# Patient Record
Sex: FEMALE | Race: OTHER | HISPANIC OR LATINO | URBAN - METROPOLITAN AREA
[De-identification: names, ages, dates, MRNs, and addresses within clinical notes are randomized per-mention and may not be internally consistent; named-entity substitution may affect disease eponyms.]

---

## 2017-04-29 ENCOUNTER — EMERGENCY (EMERGENCY)
Facility: HOSPITAL | Age: 22
LOS: 1 days | Discharge: HOME | End: 2017-04-29
Attending: EMERGENCY MEDICINE | Admitting: EMERGENCY MEDICINE
Payer: SELF-PAY

## 2017-04-29 VITALS
TEMPERATURE: 100 F | DIASTOLIC BLOOD PRESSURE: 74 MMHG | RESPIRATION RATE: 20 BRPM | HEART RATE: 87 BPM | SYSTOLIC BLOOD PRESSURE: 113 MMHG | OXYGEN SATURATION: 100 %

## 2017-04-29 VITALS — WEIGHT: 117.95 LBS | HEIGHT: 61 IN

## 2017-04-29 DIAGNOSIS — Z98.890 OTHER SPECIFIED POSTPROCEDURAL STATES: Chronic | ICD-10-CM

## 2017-04-29 PROCEDURE — 73030 X-RAY EXAM OF SHOULDER: CPT | Mod: 26,RT

## 2017-04-29 PROCEDURE — 99283 EMERGENCY DEPT VISIT LOW MDM: CPT | Mod: 25

## 2017-04-29 PROCEDURE — 73030 X-RAY EXAM OF SHOULDER: CPT

## 2017-04-29 PROCEDURE — 99283 EMERGENCY DEPT VISIT LOW MDM: CPT

## 2017-04-29 RX ORDER — IBUPROFEN 200 MG
600 TABLET ORAL ONCE
Qty: 0 | Refills: 0 | Status: COMPLETED | OUTPATIENT
Start: 2017-04-29 | End: 2017-04-29

## 2017-04-29 RX ADMIN — Medication 600 MILLIGRAM(S): at 17:44

## 2017-04-29 NOTE — ED STATDOCS - PROGRESS NOTE DETAILS
PA NOTE: Pt seen by intake physician and hpi/orders/plan reviewed. PT presenting to ED with complaints of right shoulder pain x 1 day.  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  Patient had rotator cuff surgery on the same shoulder 2 years ago.  PE: GEN: Awake, alert,  NAD,  EYES: PERRL Musculoskeletal: tenderness over AC joint - right side, no gross deformity or swelling, full ROM . NEURO: AOx3, no focal deficits SKIN: intact  PLAN: xray, sling, f/u with ortho

## 2017-04-29 NOTE — ED STATDOCS - OBJECTIVE STATEMENT
20 y/o female presents to ED c/o right shoulder pain s/p injury that occurred while at work earlier today. 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 Her current pain is exacerbated with movement. Pt reports PSHx R rotator cuff repair 2 years ago. No further complaints at this time.

## 2017-04-29 NOTE — ED STATDOCS - NS ED MD SCRIBE ATTENDING SCRIBE SECTIONS
VITAL SIGNS( Pullset)/PAST MEDICAL/SURGICAL/SOCIAL HISTORY/REVIEW OF SYSTEMS/DISPOSITION/HIV/PHYSICAL EXAM/HISTORY OF PRESENT ILLNESS

## 2017-04-29 NOTE — ED ADULT NURSE NOTE - OBJECTIVE STATEMENT
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 Fall precautions in place, will monitor.

## 2017-04-29 NOTE — ED STATDOCS - ATTENDING CONTRIBUTION TO CARE
I, Sanjuanita Sanabria, performed the initial face to face bedside interview with this patient regarding history of present illness, review of symptoms and relevant past medical, social and family history.  I completed an independent physical examination.  I was the initial provider who evaluated this patient. I have signed out the follow up of any pending tests (i.e. labs, radiological studies) to the ACP.  I have communicated the patient’s plan of care and disposition with the ACP.  The history, relevant review of systems, past medical and surgical history, medical decision making, and physical examination was documented by the scribe in my presence and I attest to the accuracy of the documentation.

## 2018-12-06 ENCOUNTER — APPOINTMENT (OUTPATIENT)
Dept: OBGYN | Facility: CLINIC | Age: 23
End: 2018-12-06
Payer: MEDICAID

## 2018-12-06 VITALS
SYSTOLIC BLOOD PRESSURE: 112 MMHG | HEIGHT: 61 IN | WEIGHT: 118 LBS | BODY MASS INDEX: 22.28 KG/M2 | DIASTOLIC BLOOD PRESSURE: 80 MMHG

## 2018-12-06 DIAGNOSIS — B37.2 CANDIDIASIS OF SKIN AND NAIL: ICD-10-CM

## 2018-12-06 DIAGNOSIS — Z82.3 FAMILY HISTORY OF STROKE: ICD-10-CM

## 2018-12-06 DIAGNOSIS — Z80.41 FAMILY HISTORY OF MALIGNANT NEOPLASM OF OVARY: ICD-10-CM

## 2018-12-06 DIAGNOSIS — Z83.3 FAMILY HISTORY OF DIABETES MELLITUS: ICD-10-CM

## 2018-12-06 DIAGNOSIS — Z78.9 OTHER SPECIFIED HEALTH STATUS: ICD-10-CM

## 2018-12-06 DIAGNOSIS — Z82.49 FAMILY HISTORY OF ISCHEMIC HEART DISEASE AND OTHER DISEASES OF THE CIRCULATORY SYSTEM: ICD-10-CM

## 2018-12-06 DIAGNOSIS — Z80.3 FAMILY HISTORY OF MALIGNANT NEOPLASM OF BREAST: ICD-10-CM

## 2018-12-06 PROCEDURE — 99203 OFFICE O/P NEW LOW 30 MIN: CPT

## 2018-12-10 LAB — BACTERIA GENITAL AEROBE CULT: ABNORMAL

## 2018-12-13 ENCOUNTER — APPOINTMENT (OUTPATIENT)
Dept: OBGYN | Facility: CLINIC | Age: 23
End: 2018-12-13

## 2019-05-03 ENCOUNTER — APPOINTMENT (OUTPATIENT)
Dept: OBGYN | Facility: CLINIC | Age: 24
End: 2019-05-03
Payer: MEDICAID

## 2019-05-03 VITALS
HEIGHT: 61 IN | DIASTOLIC BLOOD PRESSURE: 70 MMHG | BODY MASS INDEX: 22.28 KG/M2 | WEIGHT: 118 LBS | SYSTOLIC BLOOD PRESSURE: 120 MMHG

## 2019-05-03 PROCEDURE — 99213 OFFICE O/P EST LOW 20 MIN: CPT | Mod: 25

## 2019-05-03 PROCEDURE — 81003 URINALYSIS AUTO W/O SCOPE: CPT | Mod: QW

## 2019-05-03 PROCEDURE — 99395 PREV VISIT EST AGE 18-39: CPT

## 2019-05-05 LAB
BACTERIA UR CULT: NORMAL
C TRACH RRNA SPEC QL NAA+PROBE: NOT DETECTED
N GONORRHOEA RRNA SPEC QL NAA+PROBE: NOT DETECTED
SOURCE TP AMPLIFICATION: NORMAL

## 2019-05-09 ENCOUNTER — APPOINTMENT (OUTPATIENT)
Dept: OBGYN | Facility: CLINIC | Age: 24
End: 2019-05-09
Payer: MEDICAID

## 2019-05-09 ENCOUNTER — ASOB RESULT (OUTPATIENT)
Age: 24
End: 2019-05-09

## 2019-05-09 VITALS
DIASTOLIC BLOOD PRESSURE: 74 MMHG | SYSTOLIC BLOOD PRESSURE: 111 MMHG | HEART RATE: 82 BPM | WEIGHT: 119 LBS | HEIGHT: 61 IN | BODY MASS INDEX: 22.47 KG/M2

## 2019-05-09 DIAGNOSIS — N92.3 OVULATION BLEEDING: ICD-10-CM

## 2019-05-09 LAB — CYTOLOGY CVX/VAG DOC THIN PREP: NORMAL

## 2019-05-09 PROCEDURE — 99213 OFFICE O/P EST LOW 20 MIN: CPT

## 2019-05-09 PROCEDURE — 76830 TRANSVAGINAL US NON-OB: CPT

## 2019-05-09 PROCEDURE — 76857 US EXAM PELVIC LIMITED: CPT

## 2019-09-18 ENCOUNTER — APPOINTMENT (OUTPATIENT)
Dept: OBGYN | Facility: CLINIC | Age: 24
End: 2019-09-18
Payer: MEDICAID

## 2019-09-18 ENCOUNTER — RESULT CHARGE (OUTPATIENT)
Age: 24
End: 2019-09-18

## 2019-09-18 VITALS
HEIGHT: 61 IN | SYSTOLIC BLOOD PRESSURE: 128 MMHG | BODY MASS INDEX: 21.9 KG/M2 | HEART RATE: 89 BPM | DIASTOLIC BLOOD PRESSURE: 79 MMHG | WEIGHT: 116 LBS

## 2019-09-18 LAB
BILIRUB UR QL STRIP: NORMAL
GLUCOSE UR-MCNC: NORMAL
HCG UR QL: 0.2 EU/DL
HGB UR QL STRIP.AUTO: NORMAL
KETONES UR-MCNC: NORMAL
LEUKOCYTE ESTERASE UR QL STRIP: NORMAL
NITRITE UR QL STRIP: NORMAL
PH UR STRIP: 5.5
PROT UR STRIP-MCNC: NORMAL
SP GR UR STRIP: >=1.03

## 2019-09-18 PROCEDURE — 99214 OFFICE O/P EST MOD 30 MIN: CPT

## 2019-09-18 PROCEDURE — 81003 URINALYSIS AUTO W/O SCOPE: CPT | Mod: QW

## 2019-09-18 NOTE — PHYSICAL EXAM
[Mass] : no breast mass [Nipple Discharge] : no nipple discharge [Soft] : soft [Axillary LAD] : no axillary lymphadenopathy [Tender] : non tender [Distended] : not distended [H/Smegaly] : no hepatosplenomegaly [None] : no CVA tenderness

## 2019-11-02 NOTE — COUNSELING
2Assessment/Plan:    No problem-specific Assessment & Plan notes found for this encounter  Diagnoses and all orders for this visit:    Inflamed skin tag  -     Skin tag removal  -     Tissue Exam          Subjective:   Chief Complaint   Patient presents with    Procedure     skin tag removal        Patient ID: Phillip Herrera is a 79 y o  female      Patient has 2 inflamed skin tags under her breast Patient has had them for several years Patient notes taht they are red and painful and get caught in her bra Patient has no other compalints        The following portions of the patient's history were reviewed and updated as appropriate: allergies, current medications, past social history and problem list     Review of Systems   Skin:        slin tags as above         Objective:      /72   Temp 97 7 °F (36 5 °C)   Ht 5' 3" (1 6 m)   Wt 83 3 kg (183 lb 9 6 oz)   BMI 32 52 kg/m²     Details of the Procedure   Verbal consent was obtained and risk of infection and bleeding were discussed  Area was cleansed with alcohol and betadine Both sites were injected with 0 5ml of 1% lidocaine with epinephrine Area was sterilly draped Then cleansed again with betadine and slchol A picl up was used to lift each skine tage and then dermablade was used to shave lesion Adequated control of bleeding was obtained with silver nitrate stick Steril dressing were applied Patient to keep those in place for 24 hours then may bathe normally Signs of infection were reviewed with patient       Physical Exam   Skin:   2 skin tags under breast that are 3 mm in size [Breast Self Exam] : breast self exam Alert and oriented to person, place and time

## 2020-04-30 ENCOUNTER — TRANSCRIPTION ENCOUNTER (OUTPATIENT)
Age: 25
End: 2020-04-30

## 2020-04-30 ENCOUNTER — APPOINTMENT (OUTPATIENT)
Dept: OBGYN | Facility: CLINIC | Age: 25
End: 2020-04-30
Payer: MEDICAID

## 2020-04-30 DIAGNOSIS — R10.2 PELVIC AND PERINEAL PAIN: ICD-10-CM

## 2020-04-30 LAB
BILIRUB UR QL STRIP: NORMAL
CLARITY UR: CLEAR
COLLECTION METHOD: NORMAL
GLUCOSE UR-MCNC: NORMAL
HCG UR QL: 0.2 EU/DL
HGB UR QL STRIP.AUTO: NORMAL
KETONES UR-MCNC: NORMAL
LEUKOCYTE ESTERASE UR QL STRIP: NORMAL
NITRITE UR QL STRIP: NORMAL
PH UR STRIP: 6
PROT UR STRIP-MCNC: NORMAL
SP GR UR STRIP: 1.02

## 2020-04-30 PROCEDURE — 99214 OFFICE O/P EST MOD 30 MIN: CPT | Mod: 95

## 2020-04-30 NOTE — HISTORY OF PRESENT ILLNESS
[Home] : at home, [unfilled] , at the time of the visit. [Medical Office: ([facility])___] : at the medical office located in  [Patient] : the patient

## 2020-05-04 LAB — BACTERIA UR CULT: NORMAL

## 2020-05-05 ENCOUNTER — APPOINTMENT (OUTPATIENT)
Dept: OBGYN | Facility: CLINIC | Age: 25
End: 2020-05-05
Payer: COMMERCIAL

## 2020-05-05 ENCOUNTER — ASOB RESULT (OUTPATIENT)
Age: 25
End: 2020-05-05

## 2020-05-05 VITALS
BODY MASS INDEX: 21.52 KG/M2 | WEIGHT: 114 LBS | SYSTOLIC BLOOD PRESSURE: 111 MMHG | HEIGHT: 61 IN | DIASTOLIC BLOOD PRESSURE: 73 MMHG

## 2020-05-05 DIAGNOSIS — R35.0 FREQUENCY OF MICTURITION: ICD-10-CM

## 2020-05-05 DIAGNOSIS — R35.1 NOCTURIA: ICD-10-CM

## 2020-05-05 PROCEDURE — 99214 OFFICE O/P EST MOD 30 MIN: CPT

## 2020-05-05 PROCEDURE — 76830 TRANSVAGINAL US NON-OB: CPT

## 2020-05-05 PROCEDURE — 76856 US EXAM PELVIC COMPLETE: CPT | Mod: 59

## 2020-11-24 ENCOUNTER — APPOINTMENT (OUTPATIENT)
Dept: OBGYN | Facility: CLINIC | Age: 25
End: 2020-11-24
Payer: MEDICAID

## 2020-11-24 VITALS
HEIGHT: 61 IN | BODY MASS INDEX: 21.52 KG/M2 | DIASTOLIC BLOOD PRESSURE: 70 MMHG | WEIGHT: 114 LBS | SYSTOLIC BLOOD PRESSURE: 100 MMHG

## 2020-11-24 DIAGNOSIS — N76.4 ABSCESS OF VULVA: ICD-10-CM

## 2020-11-24 PROCEDURE — 99213 OFFICE O/P EST LOW 20 MIN: CPT | Mod: 25

## 2020-11-24 PROCEDURE — 56405 I&D VULVA/PERINEAL ABSCESS: CPT

## 2020-11-24 NOTE — PROCEDURE
[I & D] : I&D [Culture sent] : culture sent [Right] : right [Size: ___cm] : Cyst is ~Vcm [____% Lidocaine w/Epi] : [unfilled]% Lidocaine with Epi [Purulent Fluid] : purulent fluid [Tolerated Well] : The patient tolerated the procedure well [No Complications] : There were no complications

## 2020-11-24 NOTE — DISCUSSION/SUMMARY
[FreeTextEntry1] : vulvar abscess noted. I and d performed w culture. ag no3 for hemostasis. \par no  cellulitis. culture sent.

## 2020-11-24 NOTE — HISTORY OF PRESENT ILLNESS
[FreeTextEntry1] : 24 yo p1 here for treatment of recurrent vulvar boil, has happened 3 times after shaving, in right groin.

## 2020-11-28 LAB — BACTERIA SPEC CULT: NORMAL

## 2020-12-16 ENCOUNTER — TRANSCRIPTION ENCOUNTER (OUTPATIENT)
Age: 25
End: 2020-12-16

## 2021-01-11 ENCOUNTER — APPOINTMENT (OUTPATIENT)
Dept: OBGYN | Facility: CLINIC | Age: 26
End: 2021-01-11
Payer: MEDICAID

## 2021-01-11 VITALS
HEIGHT: 61 IN | DIASTOLIC BLOOD PRESSURE: 70 MMHG | BODY MASS INDEX: 21.34 KG/M2 | WEIGHT: 113 LBS | SYSTOLIC BLOOD PRESSURE: 120 MMHG

## 2021-01-11 DIAGNOSIS — Z30.012 ENCOUNTER FOR PRESCRIPTION OF EMERGENCY CONTRACEPTION: ICD-10-CM

## 2021-01-11 DIAGNOSIS — Z30.011 ENCOUNTER FOR INITIAL PRESCRIPTION OF CONTRACEPTIVE PILLS: ICD-10-CM

## 2021-01-11 PROCEDURE — 99395 PREV VISIT EST AGE 18-39: CPT

## 2021-01-11 PROCEDURE — 99072 ADDL SUPL MATRL&STAF TM PHE: CPT

## 2021-01-11 RX ORDER — NORGESTIMATE AND ETHINYL ESTRADIOL 7DAYSX3 LO
0.18/0.215/0.25 KIT ORAL DAILY
Qty: 3 | Refills: 2 | Status: COMPLETED | COMMUNITY
Start: 2019-09-18 | End: 2021-01-11

## 2021-01-11 NOTE — PLAN
[FreeTextEntry1] : CBE performed and SBE taught\par Pt counseled on ingrown hair and clippers over razor for grooming\par R/B/A of SARC vs LARC discussed and Pt wishes for SARC; Sprintec sent to pharmacy with direction\par PapSmear performed\par MyRisk information provided to Pt secondary to BC/OC Hx\par Follow up in one year or as needed

## 2021-01-11 NOTE — HISTORY OF PRESENT ILLNESS
[FreeTextEntry1] : 25 year old G1/1 female presenting for her annual.  She has a cyst she would like to be examined.  She is also interested in contraception today.  Hx of one prior vaginal delivery.  Denies GYN Hx of uterine fibroids, ovarian cysts or STI.  Denies medical and surgical Hx.  Family Hx of two first degree relatives with BC and OC.

## 2021-01-11 NOTE — PHYSICAL EXAM
[Appropriately responsive] : appropriately responsive [Alert] : alert [No Acute Distress] : no acute distress [No Lymphadenopathy] : no lymphadenopathy [Regular Rate Rhythm] : regular rate rhythm [No Murmurs] : no murmurs [Clear to Auscultation B/L] : clear to auscultation bilaterally [Soft] : soft [Non-tender] : non-tender [Non-distended] : non-distended [No HSM] : No HSM [No Lesions] : no lesions [No Mass] : no mass [Oriented x3] : oriented x3 [Examination Of The Breasts] : a normal appearance [No Masses] : no breast masses were palpable [Labia Majora] : normal [Labia Minora] : normal [Normal] : normal [Uterine Adnexae] : normal [FreeTextEntry1] : Small cyst on right outer mons; drained manually with pressure

## 2021-01-13 LAB
C TRACH RRNA SPEC QL NAA+PROBE: NOT DETECTED
N GONORRHOEA RRNA SPEC QL NAA+PROBE: NOT DETECTED
SOURCE TP AMPLIFICATION: NORMAL

## 2021-01-15 LAB — CYTOLOGY CVX/VAG DOC THIN PREP: ABNORMAL

## 2021-01-29 ENCOUNTER — OUTPATIENT (OUTPATIENT)
Dept: OUTPATIENT SERVICES | Facility: HOSPITAL | Age: 26
LOS: 1 days | End: 2021-01-29
Payer: MEDICAID

## 2021-01-29 ENCOUNTER — APPOINTMENT (OUTPATIENT)
Dept: ULTRASOUND IMAGING | Facility: CLINIC | Age: 26
End: 2021-01-29
Payer: MEDICAID

## 2021-01-29 DIAGNOSIS — Z80.3 FAMILY HISTORY OF MALIGNANT NEOPLASM OF BREAST: ICD-10-CM

## 2021-01-29 DIAGNOSIS — Z98.890 OTHER SPECIFIED POSTPROCEDURAL STATES: Chronic | ICD-10-CM

## 2021-01-29 DIAGNOSIS — R92.8 OTHER ABNORMAL AND INCONCLUSIVE FINDINGS ON DIAGNOSTIC IMAGING OF BREAST: ICD-10-CM

## 2021-01-29 PROCEDURE — 76641 ULTRASOUND BREAST COMPLETE: CPT | Mod: 26,50

## 2021-01-29 PROCEDURE — 76641 ULTRASOUND BREAST COMPLETE: CPT

## 2021-04-24 ENCOUNTER — TRANSCRIPTION ENCOUNTER (OUTPATIENT)
Age: 26
End: 2021-04-24

## 2021-06-07 ENCOUNTER — APPOINTMENT (OUTPATIENT)
Dept: OBGYN | Facility: CLINIC | Age: 26
End: 2021-06-07
Payer: MEDICAID

## 2021-06-07 VITALS
BODY MASS INDEX: 20.96 KG/M2 | WEIGHT: 111 LBS | SYSTOLIC BLOOD PRESSURE: 114 MMHG | HEIGHT: 61 IN | DIASTOLIC BLOOD PRESSURE: 70 MMHG

## 2021-06-07 DIAGNOSIS — R39.9 UNSPECIFIED SYMPTOMS AND SIGNS INVOLVING THE GENITOURINARY SYSTEM: ICD-10-CM

## 2021-06-07 DIAGNOSIS — B37.3 CANDIDIASIS OF VULVA AND VAGINA: ICD-10-CM

## 2021-06-07 PROCEDURE — 99214 OFFICE O/P EST MOD 30 MIN: CPT

## 2021-06-07 PROCEDURE — 99072 ADDL SUPL MATRL&STAF TM PHE: CPT

## 2021-06-07 NOTE — PLAN
[FreeTextEntry1] : \par Subjective history and physical examination consistent with a vaginal yeast infection and Rx provided.  Patient was instructed to continue her Macrobid, as directed.  Her cultures were pending and she should be contacted by her other office concerning sensitivities of these cultures.  Patient was advised to follow-up, regarding these cultures.\par \par Family history significant for breast cancer and patient was provided an ultrasound Rx in January.  She expressed concern today regarding breast discomfort and she was provided with information for Massiel Montalvo M.D.  She was instructed that if she has any issues scheduling appointment, she may contact our office and I can provide a referral based on her family history and current complaint.  All questions addressed.

## 2021-06-07 NOTE — HISTORY OF PRESENT ILLNESS
[FreeTextEntry1] : 25-year-old female presenting for symptoms, specifically vaginal discharge.  She was seen by her primary care physician and started on Macrobid twice daily for UTI symptoms and a culture is currently pending.  She is on day 3 of this antibiotic.  She reports that the discharge started at a similar time.  She reports feeling like she has to use the bathroom, but not much urine is produced.\par \par Patient has a family history significant for breast cancer, and is concerned about breast discomfort, her discomfort is around her underwire area.  She was given Rx for an ultrasound in January.\par

## 2021-06-07 NOTE — PHYSICAL EXAM
[Labia Majora] : normal [Labia Minora] : normal [Normal] : normal [FreeTextEntry4] : Scant yeast in the posterior fornix [FreeTextEntry5] : Vaginal culture collected

## 2021-06-10 LAB
A VAGINAE DNA VAG QL NAA+PROBE: NORMAL
BVAB2 DNA VAG QL NAA+PROBE: NORMAL
C KRUSEI DNA VAG QL NAA+PROBE: NEGATIVE
MEGA1 DNA VAG QL NAA+PROBE: NORMAL
T VAGINALIS RRNA SPEC QL NAA+PROBE: NEGATIVE

## 2021-12-16 ENCOUNTER — EMERGENCY (EMERGENCY)
Facility: HOSPITAL | Age: 26
LOS: 1 days | Discharge: HOME | End: 2021-12-16
Attending: EMERGENCY MEDICINE
Payer: COMMERCIAL

## 2021-12-16 VITALS
HEART RATE: 120 BPM | SYSTOLIC BLOOD PRESSURE: 132 MMHG | HEIGHT: 61 IN | OXYGEN SATURATION: 99 % | DIASTOLIC BLOOD PRESSURE: 95 MMHG | WEIGHT: 115.08 LBS | RESPIRATION RATE: 20 BRPM | TEMPERATURE: 99 F

## 2021-12-16 VITALS
OXYGEN SATURATION: 99 % | HEART RATE: 102 BPM | SYSTOLIC BLOOD PRESSURE: 136 MMHG | TEMPERATURE: 99 F | DIASTOLIC BLOOD PRESSURE: 93 MMHG | RESPIRATION RATE: 19 BRPM

## 2021-12-16 DIAGNOSIS — Z98.890 OTHER SPECIFIED POSTPROCEDURAL STATES: Chronic | ICD-10-CM

## 2021-12-16 PROCEDURE — 99283 EMERGENCY DEPT VISIT LOW MDM: CPT | Mod: 25

## 2021-12-16 PROCEDURE — 94640 AIRWAY INHALATION TREATMENT: CPT

## 2021-12-16 PROCEDURE — 99284 EMERGENCY DEPT VISIT MOD MDM: CPT

## 2021-12-16 RX ORDER — IPRATROPIUM/ALBUTEROL SULFATE 18-103MCG
3 AEROSOL WITH ADAPTER (GRAM) INHALATION ONCE
Refills: 0 | Status: COMPLETED | OUTPATIENT
Start: 2021-12-16 | End: 2021-12-16

## 2021-12-16 RX ADMIN — Medication 50 MILLIGRAM(S): at 22:47

## 2021-12-16 RX ADMIN — Medication 3 MILLILITER(S): at 21:47

## 2021-12-16 NOTE — ED PROVIDER NOTE - CLINICAL SUMMARY MEDICAL DECISION MAKING FREE TEXT BOX
24yo female with history of asthma presenting with chest tightness and shortness of breath x 1 day without symptom relief from her Qvar inhaler. Denies cough, chest pain, fever, chills. On examination patient with unlabored respiratory effort. Lungs CTAB. No wheezes. Concerning for but not limited to asthma exacerbation. Will order duoneb and reassess. 26yo female with history of asthma presenting with chest tightness and shortness of breath x 1 day without symptom relief from her Qvar inhaler. Denies cough, chest pain, fever, chills. On examination patient with unlabored respiratory effort. Lungs CTAB. No wheezes. Concerning for but not limited to asthma exacerbation. Will order duoneb, PO prednisone and reassess.

## 2021-12-16 NOTE — ED PROVIDER NOTE - OBJECTIVE STATEMENT
26yo female with history of asthma presenting with chest tightness and shortness of breath since this morning without fevers or cough. Patient states she is on Qvar inhaler BID without symptom relief today. Patient denies headache, dizziness, chest pain, abdominal pain, n/v/d, urinary symptoms, focal neurologic symptoms. Patient has close follow up with her pulmonologist and reports last asthma exacerbations in the summer 2/2 allergies.

## 2021-12-16 NOTE — ED PROVIDER NOTE - NSFOLLOWUPINSTRUCTIONS_ED_ALL_ED_FT
take your medication as prescribed    follow up with pulmonologist within 1 week     Conversation had with patient regarding results of testing. Patient agrees with plan for discharge at this time. Patient agrees to comply with follow up with PCP. Return to ED precautions and discharge instructions given to patient.    Asthma    Asthma is a condition in which the airways tighten and narrow, making it difficult to breath. Asthma episodes, also called asthma attacks, range from minor to life-threatening. Symptoms include wheezing, coughing, chest tightness, or shortness of breath. The diagnosis of asthma is made by a review of your medical history and a physical exam, but may involve additional testing. Asthma cannot be cured, but medicines and lifestyle changes can help control it. Avoid triggers of asthma which may include animal dander, pollen, mold, smoke, air pollutants, etc.     SEEK IMMEDIATE MEDICAL CARE IF YOU HAVE ANY OF THE FOLLOWING SYMPTOMS: worsening of symptoms, shortness of breath at rest, chest pain, bluish discoloration to lips or fingertips, lightheadedness/dizziness, or fever.

## 2021-12-16 NOTE — ED ADULT TRIAGE NOTE - CHIEF COMPLAINT QUOTE
patient states that she has asthma, used inhaler today not feeling any better feels tightness to chest

## 2021-12-16 NOTE — ED PROVIDER NOTE - PATIENT PORTAL LINK FT
You can access the FollowMyHealth Patient Portal offered by [facility] by registering at the following website: [email or web address]. By joining [employer]’s FollowMyHealth portal, you will also be able to view your health information using other applications (apps) compatible with our system.

## 2021-12-16 NOTE — ED PROVIDER NOTE - PHYSICAL EXAMINATION
General: Well appearing in no acute respiratory distress. Alert and cooperative.   Head: Normocephalic, atraumatic.  Eyes: PERRLA. No conjunctival injection. No scleral icterus. EOMI  ENMT: Atraumatic external nose and ears. Moist mucous membranes. Oropharynx clear.  Neck: Soft and supple. Full ROM without pain. No midline tenderness.   Cardiac: Regular rate and regular rhythm. No murmurs. Peripheral pulses 2+ and symmetric in all extremities. No LE edema.  Resp: Unlabored respiratory effort. Lungs CTAB. Speaking in full sentences. No wheezes. No conversational dyspnea.   Abd: Soft, non-tender, non-distended. No guarding or rebound. No scars.  MSK: Spine midline and non-tender.   Skin: Warm and dry.   Neuro: AO x 3. Moves all extremities symmetrically. Motor strength and sensation grossly intact.

## 2021-12-16 NOTE — ED PROVIDER NOTE - ATTENDING CONTRIBUTION TO CARE
I, Severo Duckworth, personally saw the patient with the resident, and completed the key components of the history and physical exam. I then discussed the management plan with the resident.    25 yo F hx of asthma p/w sob not relieved with inhaler. no fever. mild wheezing. patient given duoneb and prednisone. patient report feeling better and comfortable going home with medrol dose pack.

## 2021-12-25 ENCOUNTER — EMERGENCY (EMERGENCY)
Facility: HOSPITAL | Age: 26
LOS: 1 days | Discharge: HOME | End: 2021-12-25
Attending: EMERGENCY MEDICINE
Payer: COMMERCIAL

## 2021-12-25 VITALS
RESPIRATION RATE: 20 BRPM | TEMPERATURE: 100 F | HEIGHT: 61 IN | WEIGHT: 115.08 LBS | HEART RATE: 116 BPM | OXYGEN SATURATION: 100 % | SYSTOLIC BLOOD PRESSURE: 141 MMHG | DIASTOLIC BLOOD PRESSURE: 99 MMHG

## 2021-12-25 VITALS — HEART RATE: 100 BPM

## 2021-12-25 DIAGNOSIS — Z98.890 OTHER SPECIFIED POSTPROCEDURAL STATES: Chronic | ICD-10-CM

## 2021-12-25 LAB
ALBUMIN SERPL ELPH-MCNC: 4.4 G/DL — SIGNIFICANT CHANGE UP (ref 3.3–5.2)
ALP SERPL-CCNC: 40 U/L — SIGNIFICANT CHANGE UP (ref 40–120)
ALT FLD-CCNC: 9 U/L — SIGNIFICANT CHANGE UP
ANION GAP SERPL CALC-SCNC: 12 MMOL/L — SIGNIFICANT CHANGE UP (ref 5–17)
AST SERPL-CCNC: 13 U/L — SIGNIFICANT CHANGE UP
BASOPHILS # BLD AUTO: 0.03 K/UL — SIGNIFICANT CHANGE UP (ref 0–0.2)
BASOPHILS NFR BLD AUTO: 0.4 % — SIGNIFICANT CHANGE UP (ref 0–2)
BILIRUB SERPL-MCNC: <0.2 MG/DL — LOW (ref 0.4–2)
BUN SERPL-MCNC: 7.6 MG/DL — LOW (ref 8–20)
CALCIUM SERPL-MCNC: 9.4 MG/DL — SIGNIFICANT CHANGE UP (ref 8.6–10.2)
CHLORIDE SERPL-SCNC: 103 MMOL/L — SIGNIFICANT CHANGE UP (ref 98–107)
CO2 SERPL-SCNC: 23 MMOL/L — SIGNIFICANT CHANGE UP (ref 22–29)
CREAT SERPL-MCNC: 0.64 MG/DL — SIGNIFICANT CHANGE UP (ref 0.5–1.3)
D DIMER BLD IA.RAPID-MCNC: <150 NG/ML DDU — SIGNIFICANT CHANGE UP
EOSINOPHIL # BLD AUTO: 0.05 K/UL — SIGNIFICANT CHANGE UP (ref 0–0.5)
EOSINOPHIL NFR BLD AUTO: 0.7 % — SIGNIFICANT CHANGE UP (ref 0–6)
GLUCOSE SERPL-MCNC: 90 MG/DL — SIGNIFICANT CHANGE UP (ref 70–99)
HCG SERPL-ACNC: <4 MIU/ML — SIGNIFICANT CHANGE UP
HCT VFR BLD CALC: 42.2 % — SIGNIFICANT CHANGE UP (ref 34.5–45)
HGB BLD-MCNC: 14.2 G/DL — SIGNIFICANT CHANGE UP (ref 11.5–15.5)
IMM GRANULOCYTES NFR BLD AUTO: 0.3 % — SIGNIFICANT CHANGE UP (ref 0–1.5)
LYMPHOCYTES # BLD AUTO: 2.37 K/UL — SIGNIFICANT CHANGE UP (ref 1–3.3)
LYMPHOCYTES # BLD AUTO: 32.2 % — SIGNIFICANT CHANGE UP (ref 13–44)
MCHC RBC-ENTMCNC: 28.7 PG — SIGNIFICANT CHANGE UP (ref 27–34)
MCHC RBC-ENTMCNC: 33.6 GM/DL — SIGNIFICANT CHANGE UP (ref 32–36)
MCV RBC AUTO: 85.3 FL — SIGNIFICANT CHANGE UP (ref 80–100)
MONOCYTES # BLD AUTO: 0.48 K/UL — SIGNIFICANT CHANGE UP (ref 0–0.9)
MONOCYTES NFR BLD AUTO: 6.5 % — SIGNIFICANT CHANGE UP (ref 2–14)
NEUTROPHILS # BLD AUTO: 4.4 K/UL — SIGNIFICANT CHANGE UP (ref 1.8–7.4)
NEUTROPHILS NFR BLD AUTO: 59.9 % — SIGNIFICANT CHANGE UP (ref 43–77)
PLATELET # BLD AUTO: 321 K/UL — SIGNIFICANT CHANGE UP (ref 150–400)
POTASSIUM SERPL-MCNC: 4.2 MMOL/L — SIGNIFICANT CHANGE UP (ref 3.5–5.3)
POTASSIUM SERPL-SCNC: 4.2 MMOL/L — SIGNIFICANT CHANGE UP (ref 3.5–5.3)
PROT SERPL-MCNC: 7.6 G/DL — SIGNIFICANT CHANGE UP (ref 6.6–8.7)
RAPID RVP RESULT: SIGNIFICANT CHANGE UP
RBC # BLD: 4.95 M/UL — SIGNIFICANT CHANGE UP (ref 3.8–5.2)
RBC # FLD: 12.2 % — SIGNIFICANT CHANGE UP (ref 10.3–14.5)
SARS-COV-2 RNA SPEC QL NAA+PROBE: SIGNIFICANT CHANGE UP
SODIUM SERPL-SCNC: 138 MMOL/L — SIGNIFICANT CHANGE UP (ref 135–145)
WBC # BLD: 7.35 K/UL — SIGNIFICANT CHANGE UP (ref 3.8–10.5)
WBC # FLD AUTO: 7.35 K/UL — SIGNIFICANT CHANGE UP (ref 3.8–10.5)

## 2021-12-25 PROCEDURE — 84702 CHORIONIC GONADOTROPIN TEST: CPT

## 2021-12-25 PROCEDURE — 94640 AIRWAY INHALATION TREATMENT: CPT

## 2021-12-25 PROCEDURE — 99285 EMERGENCY DEPT VISIT HI MDM: CPT

## 2021-12-25 PROCEDURE — 96374 THER/PROPH/DIAG INJ IV PUSH: CPT

## 2021-12-25 PROCEDURE — 99284 EMERGENCY DEPT VISIT MOD MDM: CPT | Mod: 25

## 2021-12-25 PROCEDURE — 36415 COLL VENOUS BLD VENIPUNCTURE: CPT

## 2021-12-25 PROCEDURE — 71045 X-RAY EXAM CHEST 1 VIEW: CPT | Mod: 26

## 2021-12-25 PROCEDURE — 80053 COMPREHEN METABOLIC PANEL: CPT

## 2021-12-25 PROCEDURE — 0225U NFCT DS DNA&RNA 21 SARSCOV2: CPT

## 2021-12-25 PROCEDURE — 85025 COMPLETE CBC W/AUTO DIFF WBC: CPT

## 2021-12-25 PROCEDURE — 85379 FIBRIN DEGRADATION QUANT: CPT

## 2021-12-25 PROCEDURE — 71045 X-RAY EXAM CHEST 1 VIEW: CPT

## 2021-12-25 RX ORDER — IPRATROPIUM/ALBUTEROL SULFATE 18-103MCG
3 AEROSOL WITH ADAPTER (GRAM) INHALATION ONCE
Refills: 0 | Status: COMPLETED | OUTPATIENT
Start: 2021-12-25 | End: 2021-12-25

## 2021-12-25 RX ORDER — SODIUM CHLORIDE 9 MG/ML
1000 INJECTION INTRAMUSCULAR; INTRAVENOUS; SUBCUTANEOUS ONCE
Refills: 0 | Status: COMPLETED | OUTPATIENT
Start: 2021-12-25 | End: 2021-12-25

## 2021-12-25 RX ORDER — MAGNESIUM SULFATE 500 MG/ML
2 VIAL (ML) INJECTION ONCE
Refills: 0 | Status: COMPLETED | OUTPATIENT
Start: 2021-12-25 | End: 2021-12-25

## 2021-12-25 RX ORDER — ALBUTEROL 90 UG/1
3 AEROSOL, METERED ORAL
Qty: 360 | Refills: 0
Start: 2021-12-25 | End: 2022-01-23

## 2021-12-25 RX ORDER — ACETAMINOPHEN 500 MG
650 TABLET ORAL ONCE
Refills: 0 | Status: COMPLETED | OUTPATIENT
Start: 2021-12-25 | End: 2021-12-25

## 2021-12-25 RX ADMIN — Medication 3 MILLILITER(S): at 19:42

## 2021-12-25 RX ADMIN — SODIUM CHLORIDE 1000 MILLILITER(S): 9 INJECTION INTRAMUSCULAR; INTRAVENOUS; SUBCUTANEOUS at 21:02

## 2021-12-25 RX ADMIN — Medication 650 MILLIGRAM(S): at 19:42

## 2021-12-25 RX ADMIN — Medication 150 GRAM(S): at 19:42

## 2021-12-25 NOTE — ED STATDOCS - PROGRESS NOTE DETAILS
POLO- PT evaluated by intake physician. HPI/PE/ROS as noted above. Will follow up plan per intake physician POLO- Pt reassessed, pt feeling better at this time, vss, pt able to walk, talk and vocalized plan of action. Discussed in depth and explained to pt in depth the next steps that need to be taking including proper follow up with PCP or specialists. All incidental findings were discussed with pt as well. Pt verbalized their concerns and all questions were answered. Pt understands dispo and wants discharge. Given good instructions when to return to ED and importance of f/u.

## 2021-12-25 NOTE — ED STATDOCS - ATTENDING CONTRIBUTION TO CARE
I, Kamran Aleman, performed the initial face to face bedside interview with this patient regarding history of present illness, review of symptoms and relevant past medical, social and family history.  I completed an independent physical examination.  I was the initial provider who evaluated this patient. I have signed out the follow up of any pending tests (i.e. labs, radiological studies) to the ACP.  I have communicated the patient’s plan of care and disposition with the ACP.

## 2021-12-25 NOTE — ED STATDOCS - OBJECTIVE STATEMENT
27 y/o female with PMHx of asthma c/o SOB for the past week. Pt states the symptoms feels similar but worse than when she had an asthma attack 2 weeks ago. Pt c/o chest tightness, and lightheadedness. Pt has tried home inhaler and steroids to no relief. Pt denies fever cough. Pt has 2 covid shots. Pt is on hormonal birth control.

## 2021-12-25 NOTE — ED ADULT TRIAGE NOTE - CHIEF COMPLAINT QUOTE
Patient ambulated into ED with steady gait, Pt c/o chest tightness, palpations and SOB, pt had asthma attack a few weeks ago and was seen here inhaler not helping today. Took Tylenol @2pm

## 2021-12-25 NOTE — ED STATDOCS - PATIENT PORTAL LINK FT
You can access the FollowMyHealth Patient Portal offered by [facility] by registering at the following website: [email or web address]. By joining [employer]’s FollowMyHealth portal, you will also be able to view your health information using other applications (apps) compatible with our system.

## 2021-12-26 PROBLEM — J45.909 UNSPECIFIED ASTHMA, UNCOMPLICATED: Chronic | Status: ACTIVE | Noted: 2021-12-16

## 2022-05-06 DIAGNOSIS — Z11.3 ENCOUNTER FOR SCREENING FOR INFECTIONS WITH A PREDOMINANTLY SEXUAL MODE OF TRANSMISSION: ICD-10-CM

## 2022-05-10 ENCOUNTER — APPOINTMENT (OUTPATIENT)
Dept: OBGYN | Facility: CLINIC | Age: 27
End: 2022-05-10

## 2022-08-27 ENCOUNTER — APPOINTMENT (OUTPATIENT)
Dept: OBGYN | Facility: CLINIC | Age: 27
End: 2022-08-27

## 2022-08-27 ENCOUNTER — NON-APPOINTMENT (OUTPATIENT)
Age: 27
End: 2022-08-27

## 2022-08-27 VITALS
DIASTOLIC BLOOD PRESSURE: 76 MMHG | SYSTOLIC BLOOD PRESSURE: 114 MMHG | BODY MASS INDEX: 22.28 KG/M2 | WEIGHT: 118 LBS | HEART RATE: 86 BPM | HEIGHT: 61 IN

## 2022-08-27 DIAGNOSIS — D24.1 BENIGN NEOPLASM OF RIGHT BREAST: ICD-10-CM

## 2022-08-27 DIAGNOSIS — Z01.419 ENCOUNTER FOR GYNECOLOGICAL EXAMINATION (GENERAL) (ROUTINE) W/OUT ABNORMAL FINDINGS: ICD-10-CM

## 2022-08-27 PROCEDURE — 99395 PREV VISIT EST AGE 18-39: CPT

## 2022-08-27 RX ORDER — NORGESTIMATE AND ETHINYL ESTRADIOL 0.25-0.035
0.25-35 KIT ORAL DAILY
Qty: 1 | Refills: 0 | Status: DISCONTINUED | COMMUNITY
Start: 2021-01-11 | End: 2022-08-27

## 2022-08-27 RX ORDER — NORGESTIMATE AND ETHINYL ESTRADIOL 0.25-0.035
0.25-35 KIT ORAL
Qty: 84 | Refills: 1 | Status: DISCONTINUED | COMMUNITY
Start: 2021-01-11 | End: 2022-08-27

## 2022-08-27 NOTE — PLAN
[FreeTextEntry1] : Well woman visit\par \par pap done\par std cultures done\par OCP restart yasminRBAD, I discussed the risk of dvt and emboli  from ocp\par rt in 1 year\par \par right breast sono ordered to monitor fibroadenoma\par rec genetic testing

## 2022-08-27 NOTE — PHYSICAL EXAM
[Appropriately responsive] : appropriately responsive [Alert] : alert [No Acute Distress] : no acute distress [No Lymphadenopathy] : no lymphadenopathy [Soft] : soft [Non-tender] : non-tender [Non-distended] : non-distended [No HSM] : No HSM [No Lesions] : no lesions [No Mass] : no mass [Oriented x3] : oriented x3 [Examination Of The Breasts] : a normal appearance [___cm] : a ~M [unfilled] ~Ucm inferior lateral quadrant mass was palpated [Breast Mass Left Breast ___cm] : no mass was palpable [Labia Majora] : normal [Labia Minora] : normal [Normal] : normal [Uterine Adnexae] : normal

## 2022-08-27 NOTE — HISTORY OF PRESENT ILLNESS
[FreeTextEntry1] : 25 yo  , here for av. She would like to change ocp, feels nauseated on estraylla. Her periods are regular, not heavy. She has a h/o right breast fibroadenoma. She has a small boil that gets inflamed for two days near menses.\par \par Family h/o an aunt with breast and ovarian cancer- rec genetic testing

## 2022-08-30 ENCOUNTER — RESULT REVIEW (OUTPATIENT)
Age: 27
End: 2022-08-30

## 2022-08-30 ENCOUNTER — OUTPATIENT (OUTPATIENT)
Dept: OUTPATIENT SERVICES | Facility: HOSPITAL | Age: 27
LOS: 1 days | End: 2022-08-30
Payer: COMMERCIAL

## 2022-08-30 ENCOUNTER — APPOINTMENT (OUTPATIENT)
Dept: ULTRASOUND IMAGING | Facility: CLINIC | Age: 27
End: 2022-08-30

## 2022-08-30 DIAGNOSIS — Z98.890 OTHER SPECIFIED POSTPROCEDURAL STATES: Chronic | ICD-10-CM

## 2022-08-30 DIAGNOSIS — D24.1 BENIGN NEOPLASM OF RIGHT BREAST: ICD-10-CM

## 2022-08-30 PROCEDURE — 76641 ULTRASOUND BREAST COMPLETE: CPT | Mod: 26,RT

## 2022-08-30 PROCEDURE — 76641 ULTRASOUND BREAST COMPLETE: CPT

## 2022-09-07 LAB — CYTOLOGY CVX/VAG DOC THIN PREP: NORMAL

## 2023-01-12 NOTE — ED STATDOCS - NSICDXNOFAMILYHX_GEN_ALL_ED
I spoke with the patient in regards to her lab work that needs to be completed prior to her appt on 1/16/23 at 8:20am  Patient states she never confirmed that appt and would like to reschedule  Patient is looking for a Tuesday or Thursday appt  Informed patient that we are only in the office on Monday, Wednesday and Friday  Patient requested a Wednesday  Provided patient with appt on 2/22/23 at 10:00am  Patient accepted new appt date and time  <-- Click to add NO pertinent Family History

## 2023-05-28 ENCOUNTER — EMERGENCY (EMERGENCY)
Facility: HOSPITAL | Age: 28
LOS: 1 days | Discharge: HOME | End: 2023-05-28
Attending: EMERGENCY MEDICINE
Payer: COMMERCIAL

## 2023-05-28 VITALS
TEMPERATURE: 98 F | DIASTOLIC BLOOD PRESSURE: 86 MMHG | OXYGEN SATURATION: 98 % | RESPIRATION RATE: 16 BRPM | SYSTOLIC BLOOD PRESSURE: 132 MMHG | WEIGHT: 130.07 LBS | HEART RATE: 95 BPM

## 2023-05-28 DIAGNOSIS — Z98.890 OTHER SPECIFIED POSTPROCEDURAL STATES: Chronic | ICD-10-CM

## 2023-05-28 LAB — S PYO DNA THROAT QL NAA+PROBE: SIGNIFICANT CHANGE UP

## 2023-05-28 PROCEDURE — 87651 STREP A DNA AMP PROBE: CPT

## 2023-05-28 PROCEDURE — 99283 EMERGENCY DEPT VISIT LOW MDM: CPT

## 2023-05-28 PROCEDURE — 87798 DETECT AGENT NOS DNA AMP: CPT

## 2023-05-28 PROCEDURE — 99284 EMERGENCY DEPT VISIT MOD MDM: CPT

## 2023-05-28 RX ORDER — DEXAMETHASONE 0.5 MG/5ML
10 ELIXIR ORAL ONCE
Refills: 0 | Status: COMPLETED | OUTPATIENT
Start: 2023-05-28 | End: 2023-05-28

## 2023-05-28 RX ORDER — IBUPROFEN 200 MG
600 TABLET ORAL ONCE
Refills: 0 | Status: COMPLETED | OUTPATIENT
Start: 2023-05-28 | End: 2023-05-28

## 2023-05-28 RX ADMIN — Medication 600 MILLIGRAM(S): at 07:54

## 2023-05-28 RX ADMIN — Medication 10 MILLIGRAM(S): at 07:54

## 2023-05-28 NOTE — ED PROVIDER NOTE - OBJECTIVE STATEMENT
This is a 27 year old female with pmhx of asthma c/o on and off ear pain x 2-3 days, took Excerdin with no relief.  Pain worsened the past day.  She notes chronic sinusitis, on Flonase.  She endorses chronic congestion.  She denies any fevers, n/v/d or abdominal pain.  She denies any sick contacts, recent travel or rashes.  PCP Loida Thacker, CINDI with vaccines.  LMP 5/3.  Denies possibility of pregnancy.  Currently on birth control.

## 2023-05-28 NOTE — ED PROVIDER NOTE - CLINICAL SUMMARY MEDICAL DECISION MAKING FREE TEXT BOX
R ear pain  pain control R ear pain  pain control  pharyngitis strep PCR  control pain with decadron and motrin

## 2023-05-28 NOTE — ED PROVIDER NOTE - CARE PROVIDER_API CALL
Azael España  Otolaryngology  [address], [address]  [place] [address]  Phone: [phone]  Fax: [phone]  Follow Up Time:

## 2023-05-28 NOTE — ED PROVIDER NOTE - NSDCPRINTRESULTS_ED_ALL_ED
DISCHARGE
Patient requests all Lab, Cardiology, and Radiology Results on their Discharge Instructions

## 2023-05-28 NOTE — ED PROVIDER NOTE - ATTENDING APP SHARED VISIT CONTRIBUTION OF CARE
right sided ear pain and pharyngitis; non contributory physical exam; strep pcr noted; symptomatically improved in ED: agree with acp plan of care    This was a shared visit with JASVIR. I reviewed and verified the documentation and independently performed the documented history/exam/mdm.

## 2023-05-28 NOTE — ED ADULT NURSE NOTE - NSFALLUNIVINTERV_ED_ALL_ED
Bed/Stretcher in lowest position, wheels locked, appropriate side rails in place/Call bell, personal items and telephone in reach/Instruct patient to call for assistance before getting out of bed/chair/stretcher/Non-slip footwear applied when patient is off stretcher/Oral to call system/Physically safe environment - no spills, clutter or unnecessary equipment/Purposeful proactive rounding/Room/bathroom lighting operational, light cord in reach

## 2023-05-28 NOTE — ED PROVIDER NOTE - NSTIMEPROVIDERCAREINITIATE_GEN_ER
Progress Note  Cardiology    Admit Date: 1/1/2020   LOS: 1 day     Follow-up For:  CHF; pneumonia    Scheduled Meds:   albuterol-ipratropium  3 mL Nebulization Q6H WAKE    amLODIPine  10 mg Oral Daily    aspirin  81 mg Oral Daily    donepezil  10 mg Oral Daily    furosemide  20 mg Intravenous BID    levoFLOXacin  250 mg Oral Before breakfast    metoprolol succinate  25 mg Oral Daily    pantoprazole  40 mg Oral Daily    piperacillin-tazobactam (ZOSYN) IVPB  3.375 g Intravenous Q8H    pregabalin  150 mg Oral BID    sertraline  100 mg Oral QHS    simvastatin  40 mg Oral QHS     Continuous Infusions:  PRN Meds:acetaminophen, albuterol sulfate, calcium chloride IVPB, calcium chloride IVPB, calcium chloride IVPB, HYDROcodone-acetaminophen, magnesium oxide, magnesium sulfate IVPB, magnesium sulfate IVPB, magnesium sulfate IVPB, magnesium sulfate IVPB, potassium chloride in water, potassium chloride in water, potassium chloride in water, potassium chloride in water, potassium chloride, potassium chloride, potassium chloride, potassium chloride, sodium chloride 0.9%, sodium phosphate IVPB, sodium phosphate IVPB, sodium phosphate IVPB, sodium phosphate IVPB, sodium phosphate IVPB, Pharmacy to dose Vancomycin consult **AND** vancomycin - pharmacy to dose    Review of patient's allergies indicates:   Allergen Reactions    Eucalyptus containing products Palpitations       SUBJECTIVE:     Interval History: Patient has no complaint of chest pain or shortness of breath.  He reports less cough.    Review of Systems  Respiratory: positive for cough and sputum  Cardiovascular: positive for orthopnea, negative for chest pressure/discomfort, dyspnea, palpitations and paroxysmal nocturnal dyspnea    OBJECTIVE:     Vital Signs (Most Recent)  Temp: 98.1 °F (36.7 °C) (01/03/20 1135)  Pulse: 80 (01/03/20 1339)  Resp: 20 (01/03/20 1339)  BP: (!) 131/59 (01/03/20 1135)  SpO2: (!) 93 % (01/03/20 1339)    Vital Signs Range (Last  24H):  Temp:  [98 °F (36.7 °C)-99 °F (37.2 °C)]   Pulse:  [79-97]   Resp:  [20-24]   BP: (113-143)/(55-61)   SpO2:  [87 %-93 %]       Physical Exam:  Lungs: clear to auscultation bilaterally, normal respiratory effort  Heart: regular rate and rhythm, S1, S2 normal, no murmur, click, rub or gallop  Abdomen: soft, non-tender; bowel sounds normal; no masses,  no organomegaly  Extremities: Extremities normal, atraumatic, no cyanosis, clubbing, or edema    Recent Results (from the past 24 hour(s))   Troponin I    Collection Time: 01/02/20  3:40 PM   Result Value Ref Range    Troponin I 9.640 (HH) <=0.040 ng/mL   Procalcitonin    Collection Time: 01/02/20 10:26 PM   Result Value Ref Range    Procalcitonin 0.17 0.00 - 0.50 ng/mL   CK    Collection Time: 01/02/20 10:26 PM   Result Value Ref Range     (H) 20 - 200 U/L   CK-MB    Collection Time: 01/02/20 10:26 PM   Result Value Ref Range    CPK MB 28.3 (H) 0.1 - 6.5 ng/mL   Basic Metabolic Panel (BMP)    Collection Time: 01/03/20  3:41 AM   Result Value Ref Range    Sodium 139 136 - 145 mmol/L    Potassium 3.1 (L) 3.5 - 5.1 mmol/L    Chloride 103 95 - 110 mmol/L    CO2 24 23 - 29 mmol/L    Glucose 109 70 - 110 mg/dL    BUN, Bld 32 (H) 8 - 23 mg/dL    Creatinine 1.7 (H) 0.5 - 1.4 mg/dL    Calcium 8.0 (L) 8.7 - 10.5 mg/dL    Anion Gap 12 8 - 16 mmol/L    eGFR if African American 40.4 (A) >60 mL/min/1.73 m^2    eGFR if non African American 35.0 (A) >60 mL/min/1.73 m^2   Magnesium    Collection Time: 01/03/20  3:41 AM   Result Value Ref Range    Magnesium 1.6 1.6 - 2.6 mg/dL   CBC auto differential    Collection Time: 01/03/20  3:41 AM   Result Value Ref Range    WBC 5.03 3.90 - 12.70 K/uL    RBC 3.06 (L) 4.60 - 6.20 M/uL    Hemoglobin 9.7 (L) 14.0 - 18.0 g/dL    Hematocrit 28.9 (L) 40.0 - 54.0 %    Mean Corpuscular Volume 94 82 - 98 fL    Mean Corpuscular Hemoglobin 31.7 (H) 27.0 - 31.0 pg    Mean Corpuscular Hemoglobin Conc 33.6 32.0 - 36.0 g/dL    RDW 14.1 11.5 - 14.5  %    Platelets 58 (L) 150 - 350 K/uL    MPV 13.6 (H) 9.2 - 12.9 fL    Immature Granulocytes 0.2 0.0 - 0.5 %    Gran # (ANC) 4.0 1.8 - 7.7 K/uL    Immature Grans (Abs) 0.01 0.00 - 0.04 K/uL    Lymph # 0.7 (L) 1.0 - 4.8 K/uL    Mono # 0.3 0.3 - 1.0 K/uL    Eos # 0.0 0.0 - 0.5 K/uL    Baso # 0.01 0.00 - 0.20 K/uL    nRBC 0 0 /100 WBC    Gran% 79.9 (H) 38.0 - 73.0 %    Lymph% 13.5 (L) 18.0 - 48.0 %    Mono% 6.2 4.0 - 15.0 %    Eosinophil% 0.0 0.0 - 8.0 %    Basophil% 0.2 0.0 - 1.9 %    Differential Method Automated    Vancomycin, random    Collection Time: 01/03/20  3:41 AM   Result Value Ref Range    Vancomycin, Random 11.4 Not established ug/mL   Renal function panel    Collection Time: 01/03/20  3:41 AM   Result Value Ref Range    Glucose 109 70 - 110 mg/dL    Sodium 139 136 - 145 mmol/L    Potassium 3.1 (L) 3.5 - 5.1 mmol/L    Chloride 103 95 - 110 mmol/L    CO2 24 23 - 29 mmol/L    BUN, Bld 32 (H) 8 - 23 mg/dL    Calcium 8.0 (L) 8.7 - 10.5 mg/dL    Creatinine 1.7 (H) 0.5 - 1.4 mg/dL    Albumin 2.9 (L) 3.5 - 5.2 g/dL    Phosphorus 3.1 2.7 - 4.5 mg/dL    eGFR if  40.4 (A) >60 mL/min/1.73 m^2    eGFR if non African American 35.0 (A) >60 mL/min/1.73 m^2    Anion Gap 12 8 - 16 mmol/L   CBC auto differential    Collection Time: 01/03/20  3:41 AM   Result Value Ref Range    WBC 5.03 3.90 - 12.70 K/uL    RBC 3.06 (L) 4.60 - 6.20 M/uL    Hemoglobin 9.7 (L) 14.0 - 18.0 g/dL    Hematocrit 28.9 (L) 40.0 - 54.0 %    Mean Corpuscular Volume 94 82 - 98 fL    Mean Corpuscular Hemoglobin 31.7 (H) 27.0 - 31.0 pg    Mean Corpuscular Hemoglobin Conc 33.6 32.0 - 36.0 g/dL    RDW 14.1 11.5 - 14.5 %    Platelets 58 (L) 150 - 350 K/uL    MPV 13.6 (H) 9.2 - 12.9 fL    Immature Granulocytes 0.2 0.0 - 0.5 %    Gran # (ANC) 4.0 1.8 - 7.7 K/uL    Immature Grans (Abs) 0.01 0.00 - 0.04 K/uL    Lymph # 0.7 (L) 1.0 - 4.8 K/uL    Mono # 0.3 0.3 - 1.0 K/uL    Eos # 0.0 0.0 - 0.5 K/uL    Baso # 0.01 0.00 - 0.20 K/uL    nRBC 0 0 /100  WBC    Gran% 79.9 (H) 38.0 - 73.0 %    Lymph% 13.5 (L) 18.0 - 48.0 %    Mono% 6.2 4.0 - 15.0 %    Eosinophil% 0.0 0.0 - 8.0 %    Basophil% 0.2 0.0 - 1.9 %    Differential Method Automated    Brain natriuretic peptide    Collection Time: 01/03/20  3:42 AM   Result Value Ref Range     (H) 0 - 99 pg/mL   Echo Color Flow Doppler? No; 2D? Yes; Limited Follow-Up Exam? Yes (EF, WMA, MR and PA pressure)    Collection Time: 01/03/20  9:27 AM   Result Value Ref Range    BSA 1.88 m2    LVIDD 6.03 (A) 3.5 - 6.0 cm    LVIDS 5.04 (A) 2.1 - 4.0 cm    FS 16 28 - 44 %    TR Max José Miguel 3.76 m/s    Triscuspid Valve Regurgitation Peak Gradient 57 mmHg    Right Atrial Pressure (from IVC) 8 mmHg    TV rest pulmonary artery pressure 65 mmHg   Basic metabolic panel    Collection Time: 01/03/20  1:41 PM   Result Value Ref Range    Sodium 137 136 - 145 mmol/L    Potassium 3.4 (L) 3.5 - 5.1 mmol/L    Chloride 99 95 - 110 mmol/L    CO2 25 23 - 29 mmol/L    Glucose 98 70 - 110 mg/dL    BUN, Bld 32 (H) 8 - 23 mg/dL    Creatinine 1.8 (H) 0.5 - 1.4 mg/dL    Calcium 8.5 (L) 8.7 - 10.5 mg/dL    Anion Gap 13 8 - 16 mmol/L    eGFR if African American 37.7 (A) >60 mL/min/1.73 m^2    eGFR if non  32.6 (A) >60 mL/min/1.73 m^2   Magnesium    Collection Time: 01/03/20  1:41 PM   Result Value Ref Range    Magnesium 1.6 1.6 - 2.6 mg/dL       Diagnostic Results:  Labs: Reviewed  ECG: Reviewed  X-Ray: Reviewed    ASSESSMENT/PLAN:     Procalcitonin is normal.  He appears better with furosemide IV b.i.d..  Continue careful diuresis; monitor renal function.  BNP elevated perhaps secondary to IV fluids-more so than at admit.  EKG is nonacute.     28-May-2023 07:25

## 2023-05-28 NOTE — ED ADULT NURSE NOTE - OBJECTIVE STATEMENT
pt a+ox3, reports left ear pain x2 days. states she tried pain control at home with no relief. pt in no apparent distress, awaiting eval.

## 2023-05-28 NOTE — ED PROVIDER NOTE - NS ED ATTENDING STATEMENT MOD
This was a shared visit with the JASVIR. I reviewed and verified the documentation and independently performed the documented:

## 2023-05-28 NOTE — ED ADULT TRIAGE NOTE - BP NONINVASIVE DIASTOLIC (MM HG)
86 [de-identified] : 08/09/2022: \par Atypical A-flutter, VR 62/min, nonspecific T wave changes [de-identified] : 09/17/20:\par LVEF 63%, G2DD\par Moderate LAE\par Mild MR. [de-identified] : Adenosine MPI 09/17/21:\par No ischemia.

## 2023-05-28 NOTE — ED PROVIDER NOTE - PATIENT PORTAL LINK FT
You can access the FollowMyHealth Patient Portal offered by [facility] by registering at the following website: [email or web address]. By joining [facility]’s FollowMyHealth portal, you will also be able to view your health information using other applications (apps) compatible with our system.

## 2023-05-28 NOTE — ED ADULT TRIAGE NOTE - CHIEF COMPLAINT QUOTE
Ambulatory to ED c/o R ear pain x2 days. Patient states she took Excedrin w/o relief at 2200. Patient denies fevers/chills/rhinorrhea.

## 2023-06-19 ENCOUNTER — EMERGENCY (EMERGENCY)
Facility: HOSPITAL | Age: 28
LOS: 1 days | Discharge: HOME | End: 2023-06-19
Attending: EMERGENCY MEDICINE
Payer: COMMERCIAL

## 2023-06-19 VITALS
HEIGHT: 61 IN | DIASTOLIC BLOOD PRESSURE: 96 MMHG | SYSTOLIC BLOOD PRESSURE: 153 MMHG | TEMPERATURE: 98 F | OXYGEN SATURATION: 100 % | HEART RATE: 109 BPM | RESPIRATION RATE: 30 BRPM | WEIGHT: 133.82 LBS

## 2023-06-19 DIAGNOSIS — Z98.890 OTHER SPECIFIED POSTPROCEDURAL STATES: Chronic | ICD-10-CM

## 2023-06-19 PROCEDURE — 99284 EMERGENCY DEPT VISIT MOD MDM: CPT

## 2023-06-19 PROCEDURE — 94640 AIRWAY INHALATION TREATMENT: CPT

## 2023-06-19 PROCEDURE — 99284 EMERGENCY DEPT VISIT MOD MDM: CPT | Mod: 25

## 2023-06-19 RX ORDER — ALBUTEROL 90 UG/1
2 AEROSOL, METERED ORAL
Qty: 2 | Refills: 0
Start: 2023-06-19 | End: 2023-06-23

## 2023-06-19 RX ORDER — DEXAMETHASONE 0.5 MG/5ML
16 ELIXIR ORAL ONCE
Refills: 0 | Status: DISCONTINUED | OUTPATIENT
Start: 2023-06-19 | End: 2023-06-19

## 2023-06-19 RX ORDER — DEXAMETHASONE 0.5 MG/5ML
16 ELIXIR ORAL ONCE
Refills: 0 | Status: COMPLETED | OUTPATIENT
Start: 2023-06-19 | End: 2023-06-19

## 2023-06-19 RX ORDER — IPRATROPIUM/ALBUTEROL SULFATE 18-103MCG
3 AEROSOL WITH ADAPTER (GRAM) INHALATION
Refills: 0 | Status: COMPLETED | OUTPATIENT
Start: 2023-06-19 | End: 2023-06-19

## 2023-06-19 RX ADMIN — Medication 3 MILLILITER(S): at 09:10

## 2023-06-19 RX ADMIN — Medication 3 MILLILITER(S): at 08:01

## 2023-06-19 RX ADMIN — Medication 16 MILLIGRAM(S): at 08:02

## 2023-06-19 RX ADMIN — Medication 3 MILLILITER(S): at 08:04

## 2023-06-19 NOTE — ED PROVIDER NOTE - PHYSICAL EXAMINATION
Gen: well appearing, no acute distress  Head: normocephalic, atraumatic  EENT: EOMI, PERRLA, moist mucous membranes, no scleral icterus, no JVD  Lung: (+)mildly increased  work of breathing, clear to auscultation bilaterally, no wheezing, speaking in full sentences  CV: regular rate, regular rhythm, normal s1/s2, 2+ radial pulses bilaterally  Abd: soft, non-tender, non-distended  MSK: No edema, no visible deformities, full range of motion in all 4 extremities  Neuro: Awake, alert, no focal neurologic deficits  Psych: normal affect, normal speech

## 2023-06-19 NOTE — ED PROVIDER NOTE - OBJECTIVE STATEMENT
27 year old female with PMHx asthma (hospitalized as a child, never intubated) presenting for evaluation of shortness of breath x 1 day. Patient reports symptoms started after cleaning some dust from her patio yesterday but were manageable, slightly improved with her albuterol inhaler. While at work, noted symptoms acutely worsened after someone walked by with strong perfume. She took 2 puffs of her inhaler at 12AM and another 2 puffs at 5AM, felt that she was still having trouble getting air in/her chest was tight, so came to ED. Denies any fevers, cough, chills. Denies recent procedures, travel, prolonged immobilization, leg swelling, hemoptysis, prior clots.

## 2023-06-19 NOTE — ED ADULT TRIAGE NOTE - CHIEF COMPLAINT QUOTE
Pt was working upstairs c/o of SOB, chest tightness that started yesterday but is getting worse. Inhaler is not helping

## 2023-06-19 NOTE — ED PROVIDER NOTE - PATIENT PORTAL LINK FT
You can access the FollowMyHealth Patient Portal offered by [facility] by registering at the following website: [email or web address]. By joining [employer]’s FollowMyHealth portal, you will also be able to view your health information using other applications (apps) compatible with our system.

## 2023-06-19 NOTE — ED PROVIDER NOTE - ATTENDING CONTRIBUTION TO CARE
The patient seen with resident    Asthma Exacerbation    I, Ruben Razo, performed the initial face to face bedside interview with this patient regarding history of present illness, review of symptoms and relevant past medical, social and family history.  I completed an independent physical examination.  I was the initial provider who evaluated this patient. I have signed out the follow up of any pending tests (i.e. labs, radiological studies) to the resident.  I have communicated the patient’s plan of care and disposition with the resident

## 2023-06-19 NOTE — ED PROVIDER NOTE - CLINICAL SUMMARY MEDICAL DECISION MAKING FREE TEXT BOX
27 year old female with PMHx asthma presenting for evaluation of difficulty breathing and chest tightness x 1 day,  not improved with her rescue inhaler. 27 year old female with PMHx asthma presenting for evaluation of difficulty breathing and chest tightness x 1 day,  not improved with her rescue inhaler. Patient able to identify triggers to her symptoms. Treated with duoneb x3 and decadron 16mg PO with significant improvement of symptoms. States it is easier to breathe. Hemodynamically stable. Has appointment with PCP this week and has albuterol at home. Stable for dc home to f/u with PCP.

## 2023-06-19 NOTE — ED ADULT NURSE NOTE - NS PRO PASSIVE SMOKE EXP
Unknown Same Histology In Subsequent Stages Text: The pattern and morphology of the tumor is as described in the first stage.

## 2023-06-19 NOTE — ED PROVIDER NOTE - NS ED ROS FT
Gen: No fever, no change in activity level  ENT: No congestion, no rhinorrhea  Resp: No cough, (+) trouble breathing  Cardiovascular: No chest pain, no palpitation  Gastrointestinal: No nausea, no vomiting, no diarrhea  :  No change in urine output; no dysuria, no hematuria  MS: No joint or muscle pain  Neuro: No headache; no abnormal movements  Remainder negative, except as per the HPI

## 2023-06-19 NOTE — ED ADULT NURSE NOTE - NSFALLUNIVINTERV_ED_ALL_ED
Bed/Stretcher in lowest position, wheels locked, appropriate side rails in place/Call bell, personal items and telephone in reach/Instruct patient to call for assistance before getting out of bed/chair/stretcher/Non-slip footwear applied when patient is off stretcher/[place] to call system/Physically safe environment - no spills, clutter or unnecessary equipment/Purposeful proactive rounding/Room/bathroom lighting operational, light cord in reach

## 2023-06-19 NOTE — ED PROVIDER NOTE - NSFOLLOWUPINSTRUCTIONS_ED_ALL_ED_FT
- Follow up with your primary doctor      SEEK IMMEDIATE MEDICAL CARE IF YOU HAVE ANY OF THE FOLLOWING SYMPTOMS: worsening of symptoms, shortness of breath at rest, chest pain, bluish discoloration to lips or fingertips, lightheadedness/dizziness, or fever.

## 2023-06-25 ENCOUNTER — EMERGENCY (EMERGENCY)
Facility: HOSPITAL | Age: 28
LOS: 1 days | Discharge: HOME | End: 2023-06-25
Attending: EMERGENCY MEDICINE
Payer: COMMERCIAL

## 2023-06-25 VITALS
WEIGHT: 130.07 LBS | TEMPERATURE: 98 F | SYSTOLIC BLOOD PRESSURE: 132 MMHG | HEART RATE: 119 BPM | HEIGHT: 61 IN | OXYGEN SATURATION: 100 % | RESPIRATION RATE: 17 BRPM | DIASTOLIC BLOOD PRESSURE: 97 MMHG

## 2023-06-25 VITALS
SYSTOLIC BLOOD PRESSURE: 119 MMHG | TEMPERATURE: 98 F | DIASTOLIC BLOOD PRESSURE: 77 MMHG | OXYGEN SATURATION: 99 % | HEART RATE: 85 BPM | RESPIRATION RATE: 18 BRPM

## 2023-06-25 DIAGNOSIS — Z98.890 OTHER SPECIFIED POSTPROCEDURAL STATES: Chronic | ICD-10-CM

## 2023-06-25 LAB
ALBUMIN SERPL ELPH-MCNC: 4.3 G/DL — SIGNIFICANT CHANGE UP (ref 3.3–5.2)
ALP SERPL-CCNC: 43 U/L — SIGNIFICANT CHANGE UP (ref 40–120)
ALT FLD-CCNC: 13 U/L — SIGNIFICANT CHANGE UP
ANION GAP SERPL CALC-SCNC: 12 MMOL/L — SIGNIFICANT CHANGE UP (ref 5–17)
AST SERPL-CCNC: 15 U/L — SIGNIFICANT CHANGE UP
BASOPHILS # BLD AUTO: 0.02 K/UL — SIGNIFICANT CHANGE UP (ref 0–0.2)
BASOPHILS NFR BLD AUTO: 0.3 % — SIGNIFICANT CHANGE UP (ref 0–2)
BILIRUB SERPL-MCNC: <0.2 MG/DL — LOW (ref 0.4–2)
BUN SERPL-MCNC: 13.6 MG/DL — SIGNIFICANT CHANGE UP (ref 8–20)
CALCIUM SERPL-MCNC: 9.4 MG/DL — SIGNIFICANT CHANGE UP (ref 8.4–10.5)
CHLORIDE SERPL-SCNC: 98 MMOL/L — SIGNIFICANT CHANGE UP (ref 96–108)
CO2 SERPL-SCNC: 26 MMOL/L — SIGNIFICANT CHANGE UP (ref 22–29)
CREAT SERPL-MCNC: 0.63 MG/DL — SIGNIFICANT CHANGE UP (ref 0.5–1.3)
D DIMER BLD IA.RAPID-MCNC: <150 NG/ML DDU — SIGNIFICANT CHANGE UP
EGFR: 125 ML/MIN/1.73M2 — SIGNIFICANT CHANGE UP
EOSINOPHIL # BLD AUTO: 0.08 K/UL — SIGNIFICANT CHANGE UP (ref 0–0.5)
EOSINOPHIL NFR BLD AUTO: 1 % — SIGNIFICANT CHANGE UP (ref 0–6)
GLUCOSE SERPL-MCNC: 69 MG/DL — LOW (ref 70–99)
HCG SERPL-ACNC: <4 MIU/ML — SIGNIFICANT CHANGE UP
HCT VFR BLD CALC: 40.1 % — SIGNIFICANT CHANGE UP (ref 34.5–45)
HGB BLD-MCNC: 13.3 G/DL — SIGNIFICANT CHANGE UP (ref 11.5–15.5)
IMM GRANULOCYTES NFR BLD AUTO: 0.3 % — SIGNIFICANT CHANGE UP (ref 0–0.9)
LYMPHOCYTES # BLD AUTO: 2.79 K/UL — SIGNIFICANT CHANGE UP (ref 1–3.3)
LYMPHOCYTES # BLD AUTO: 35.6 % — SIGNIFICANT CHANGE UP (ref 13–44)
MCHC RBC-ENTMCNC: 27.7 PG — SIGNIFICANT CHANGE UP (ref 27–34)
MCHC RBC-ENTMCNC: 33.2 GM/DL — SIGNIFICANT CHANGE UP (ref 32–36)
MCV RBC AUTO: 83.5 FL — SIGNIFICANT CHANGE UP (ref 80–100)
MONOCYTES # BLD AUTO: 0.47 K/UL — SIGNIFICANT CHANGE UP (ref 0–0.9)
MONOCYTES NFR BLD AUTO: 6 % — SIGNIFICANT CHANGE UP (ref 2–14)
NEUTROPHILS # BLD AUTO: 4.46 K/UL — SIGNIFICANT CHANGE UP (ref 1.8–7.4)
NEUTROPHILS NFR BLD AUTO: 56.8 % — SIGNIFICANT CHANGE UP (ref 43–77)
PLATELET # BLD AUTO: 346 K/UL — SIGNIFICANT CHANGE UP (ref 150–400)
POTASSIUM SERPL-MCNC: 4.1 MMOL/L — SIGNIFICANT CHANGE UP (ref 3.5–5.3)
POTASSIUM SERPL-SCNC: 4.1 MMOL/L — SIGNIFICANT CHANGE UP (ref 3.5–5.3)
PROT SERPL-MCNC: 7.7 G/DL — SIGNIFICANT CHANGE UP (ref 6.6–8.7)
RAPID RVP RESULT: SIGNIFICANT CHANGE UP
RBC # BLD: 4.8 M/UL — SIGNIFICANT CHANGE UP (ref 3.8–5.2)
RBC # FLD: 12.5 % — SIGNIFICANT CHANGE UP (ref 10.3–14.5)
SARS-COV-2 RNA SPEC QL NAA+PROBE: SIGNIFICANT CHANGE UP
SODIUM SERPL-SCNC: 136 MMOL/L — SIGNIFICANT CHANGE UP (ref 135–145)
TROPONIN T SERPL-MCNC: <0.01 NG/ML — SIGNIFICANT CHANGE UP (ref 0–0.06)
WBC # BLD: 7.84 K/UL — SIGNIFICANT CHANGE UP (ref 3.8–10.5)
WBC # FLD AUTO: 7.84 K/UL — SIGNIFICANT CHANGE UP (ref 3.8–10.5)

## 2023-06-25 PROCEDURE — 84484 ASSAY OF TROPONIN QUANT: CPT

## 2023-06-25 PROCEDURE — 36415 COLL VENOUS BLD VENIPUNCTURE: CPT

## 2023-06-25 PROCEDURE — 0225U NFCT DS DNA&RNA 21 SARSCOV2: CPT

## 2023-06-25 PROCEDURE — 96375 TX/PRO/DX INJ NEW DRUG ADDON: CPT

## 2023-06-25 PROCEDURE — 93010 ELECTROCARDIOGRAM REPORT: CPT

## 2023-06-25 PROCEDURE — 85025 COMPLETE CBC W/AUTO DIFF WBC: CPT

## 2023-06-25 PROCEDURE — 99285 EMERGENCY DEPT VISIT HI MDM: CPT

## 2023-06-25 PROCEDURE — 99285 EMERGENCY DEPT VISIT HI MDM: CPT | Mod: 25

## 2023-06-25 PROCEDURE — 71046 X-RAY EXAM CHEST 2 VIEWS: CPT

## 2023-06-25 PROCEDURE — 94640 AIRWAY INHALATION TREATMENT: CPT

## 2023-06-25 PROCEDURE — 96374 THER/PROPH/DIAG INJ IV PUSH: CPT

## 2023-06-25 PROCEDURE — 80053 COMPREHEN METABOLIC PANEL: CPT

## 2023-06-25 PROCEDURE — 71046 X-RAY EXAM CHEST 2 VIEWS: CPT | Mod: 26

## 2023-06-25 PROCEDURE — 85379 FIBRIN DEGRADATION QUANT: CPT

## 2023-06-25 PROCEDURE — 84702 CHORIONIC GONADOTROPIN TEST: CPT

## 2023-06-25 PROCEDURE — 93005 ELECTROCARDIOGRAM TRACING: CPT

## 2023-06-25 RX ORDER — SODIUM CHLORIDE 9 MG/ML
1000 INJECTION INTRAMUSCULAR; INTRAVENOUS; SUBCUTANEOUS ONCE
Refills: 0 | Status: COMPLETED | OUTPATIENT
Start: 2023-06-25 | End: 2023-06-25

## 2023-06-25 RX ORDER — FLUTICASONE PROPIONATE 220 MCG
1 AEROSOL WITH ADAPTER (GRAM) INHALATION
Qty: 1 | Refills: 0
Start: 2023-06-25 | End: 2023-07-01

## 2023-06-25 RX ORDER — MAGNESIUM SULFATE 500 MG/ML
2 VIAL (ML) INJECTION ONCE
Refills: 0 | Status: COMPLETED | OUTPATIENT
Start: 2023-06-25 | End: 2023-06-25

## 2023-06-25 RX ORDER — IPRATROPIUM/ALBUTEROL SULFATE 18-103MCG
3 AEROSOL WITH ADAPTER (GRAM) INHALATION ONCE
Refills: 0 | Status: COMPLETED | OUTPATIENT
Start: 2023-06-25 | End: 2023-06-25

## 2023-06-25 RX ADMIN — Medication 3 MILLILITER(S): at 19:55

## 2023-06-25 RX ADMIN — SODIUM CHLORIDE 1000 MILLILITER(S): 9 INJECTION INTRAMUSCULAR; INTRAVENOUS; SUBCUTANEOUS at 19:55

## 2023-06-25 RX ADMIN — Medication 125 MILLIGRAM(S): at 19:54

## 2023-06-25 RX ADMIN — Medication 150 GRAM(S): at 19:54

## 2023-06-25 NOTE — ED STATDOCS - CARE PROVIDER_API CALL
Donald Bell  Pulmonary Disease  [address], [address]  [place] [address]  Phone: [phone]  Fax: [phone]  Follow Up Time:

## 2023-06-25 NOTE — ED ADULT NURSE NOTE - OBJECTIVE STATEMENT
c/o chest tightness and difficulty taking a deep breath x 1 week. Pt seen in ED 1 week ago and discharged with prednisone which did not ease s/s. Pt denies HA, dizziness, N/V/D, fevers, chills. Pt AOx4, speaking coherently, respirations even and unlabored on RA, skin warm and dry.

## 2023-06-25 NOTE — ED STATDOCS - CLINICAL SUMMARY MEDICAL DECISION MAKING FREE TEXT BOX
26 y/o female w/ hx of asthma p/w asthma exacerbation x 1 wk.  Will treat with Mg, dual nebs, Also r/o causes like PE and ACS. Will do d dimer 26 y/o female w/ hx of asthma p/w asthma exacerbation x 1 wk.  Will treat with Mg, dual nebs, Also r/o causes like PE and ACS. Will do d dimer    LYNDA fraser: labs without acute findings. CXR without obvious pna. Pt feeling improved. Rx sent for flovent and prednisone. To FU with pulm. Discussed return precautions.

## 2023-06-25 NOTE — ED ADULT NURSE NOTE - NSICDXPASTMEDICALHX_GEN_ALL_CORE_FT
LOV09/17/2020  Upcoming visit 09/17/2021  Last labs09/17/2020  Last [id number]/25/2020  Please advise     PAST MEDICAL HISTORY:  Asthma

## 2023-06-25 NOTE — ED STATDOCS - PROGRESS NOTE DETAILS
PT evaluated by intake physician. HPI/PE/ROS as noted above. Will follow up plan per intake physician. see MDM

## 2023-06-25 NOTE — ED STATDOCS - PATIENT PORTAL LINK FT
You can access the FollowMyHealth Patient Portal offered by [facility] by registering at the following website: [email or web address]. By joining [facility]’s FollowMyHealth portal, you will also be able to view your health information using other applications (apps) compatible with our system.

## 2023-06-25 NOTE — ED STATDOCS - NSICDXNOPASTMEDICALHX_GEN_ALL_ED
I called and spoke with patient regarding message below.    Patient informed me her psychiatrist started her on Cymbalta 30mg BID. She is replacing it with Wellbutrin.     Patient is requesting orders for a bone density, colonoscopy and ophtho referral. Please assist.        <-- Click to add NO pertinent Past Medical History

## 2023-06-25 NOTE — ED ADULT TRIAGE NOTE - CHIEF COMPLAINT QUOTE
asthma exacerbation x 1 week; "makes me feel like its hard to take a deep breath". was seen in ED Friday placed on prednisone (last dose yesterday)., neds/inhaler - minimal relief.

## 2023-06-25 NOTE — ED ADULT NURSE NOTE - NSFALLUNIVINTERV_ED_ALL_ED
Bed/Stretcher in lowest position, wheels locked, appropriate side rails in place/Call bell, personal items and telephone in reach/Instruct patient to call for assistance before getting out of bed/chair/stretcher/Non-slip footwear applied when patient is off stretcher/[place] to call system/Physically safe environment - no spills, clutter or unnecessary equipment/Purposeful proactive rounding/Room/bathroom lighting operational, light cord in reach

## 2023-06-25 NOTE — ED STATDOCS - OBJECTIVE STATEMENT
28 y/o female w/ hx of asthma p/w asthma exacerbation x 1 wk. Reports feels sob and chest tightness. Visiited [facility] 1 wk ago and received Prednisone which she has since finished and notes did not help to ease symptoms. Otherwise denies birth control, recent travel, fever, chance of pregnancy, hx of smoking.

## 2023-07-26 ENCOUNTER — NON-APPOINTMENT (OUTPATIENT)
Age: 28
End: 2023-07-26

## 2023-08-08 ENCOUNTER — APPOINTMENT (OUTPATIENT)
Dept: PULMONOLOGY | Facility: CLINIC | Age: 28
End: 2023-08-08
Payer: COMMERCIAL

## 2023-08-08 VITALS
OXYGEN SATURATION: 97 % | SYSTOLIC BLOOD PRESSURE: 115 MMHG | HEART RATE: 90 BPM | BODY MASS INDEX: 24 KG/M2 | WEIGHT: 127 LBS | RESPIRATION RATE: 16 BRPM | DIASTOLIC BLOOD PRESSURE: 70 MMHG

## 2023-08-08 PROCEDURE — 99204 OFFICE O/P NEW MOD 45 MIN: CPT

## 2023-08-08 RX ORDER — NITROFURANTOIN (MONOHYDRATE/MACROCRYSTALS) 25; 75 MG/1; MG/1
100 CAPSULE ORAL TWICE DAILY
Qty: 14 | Refills: 0 | Status: COMPLETED | COMMUNITY
Start: 2019-05-03 | End: 2023-08-08

## 2023-08-08 RX ORDER — FLUCONAZOLE 150 MG/1
150 TABLET ORAL
Qty: 1 | Refills: 2 | Status: COMPLETED | COMMUNITY
Start: 2021-06-07 | End: 2023-08-08

## 2023-08-08 RX ORDER — UBIDECARENONE/VIT E ACET 100MG-5
CAPSULE ORAL
Refills: 0 | Status: ACTIVE | COMMUNITY

## 2023-08-08 RX ORDER — ALBUTEROL SULFATE 2.5 MG/3ML
(2.5 MG/3ML) SOLUTION RESPIRATORY (INHALATION)
Refills: 0 | Status: ACTIVE | COMMUNITY

## 2023-08-08 RX ORDER — FLUCONAZOLE 150 MG/1
150 TABLET ORAL
Qty: 1 | Refills: 0 | Status: COMPLETED | COMMUNITY
Start: 2018-12-07 | End: 2023-08-08

## 2023-08-08 RX ORDER — TERCONAZOLE 8 MG/G
0.8 CREAM VAGINAL
Qty: 1 | Refills: 0 | Status: COMPLETED | COMMUNITY
Start: 2018-12-07 | End: 2023-08-08

## 2023-08-08 NOTE — REVIEW OF SYSTEMS
[Negative] : Endocrine [TextBox_14] : per HPI [TextBox_30] : per HPI [TextBox_44] : per HPI [TextBox_57] : per HPI

## 2023-08-08 NOTE — ASSESSMENT
[FreeTextEntry1] : Asthma with more difficult control this summer. Allergies. Lungs currently clear.

## 2023-08-08 NOTE — PLAN
[TextEntry] : Add LABA; breo or whatever is covered by her plan; gave her a list of alternatives with pictures. Albuterol prn. Labwork as above. PFT. Allergy f/u and trt. Further reccs will come.

## 2023-08-08 NOTE — PHYSICAL EXAM
[No Acute Distress] : no acute distress [Normal Oropharynx] : normal oropharynx [Normal Appearance] : normal appearance [Normal Rate/Rhythm] : normal rate/rhythm [Normal S1, S2] : normal s1, s2 [No Resp Distress] : no resp distress [No Acc Muscle Use] : no acc muscle use [Normal Rhythm and Effort] : normal rhythm and effort [Clear to Auscultation Bilaterally] : clear to auscultation bilaterally [Benign] : benign [Normal Color/ Pigmentation] : normal color/ pigmentation [Oriented x3] : oriented x3 [Normal Mood] : normal mood [Normal Affect] : normal affect

## 2023-08-08 NOTE — PROCEDURE
[FreeTextEntry1] : ACC: [id number] EXAM: XR CHEST PA LAT 2V ORDERED BY: MITCHELL GOOD  PROCEDURE DATE: 06/25/2023    INTERPRETATION: INDICATION: Short of breath  PA and lateral chest  COMPARISON: 12/25/2021  FINDINGS: Heart/Vascular: The heart size, mediastinum, hilum and aorta are within normal limits for projection. Pulmonary: Midline trachea. There is no focal infiltrate, congestion or effusion. Bones: There is no fracture. Lines and catheter: None  Impression:  No acute pulmonary disease.  --- End of Report ---      CECE MONROY DO; Attending Radiologist This document has been electronically signed. Jun 26 2023 10:49AM

## 2023-08-08 NOTE — SIGNATURES
[TextEntry] : Eddie Brewer MD, FCCP, [facility] Pulmonary and Sleep Medicine [facility] at [place]

## 2023-08-11 ENCOUNTER — RESULT CHARGE (OUTPATIENT)
Age: 28
End: 2023-08-11

## 2023-08-11 ENCOUNTER — APPOINTMENT (OUTPATIENT)
Dept: ORTHOPEDIC SURGERY | Facility: CLINIC | Age: 28
End: 2023-08-11
Payer: COMMERCIAL

## 2023-08-11 VITALS — HEIGHT: 61 IN | WEIGHT: 127 LBS | BODY MASS INDEX: 23.98 KG/M2

## 2023-08-11 DIAGNOSIS — M77.8 OTHER ENTHESOPATHIES, NOT ELSEWHERE CLASSIFIED: ICD-10-CM

## 2023-08-11 PROCEDURE — 73110 X-RAY EXAM OF WRIST: CPT | Mod: RT

## 2023-08-11 PROCEDURE — 99203 OFFICE O/P NEW LOW 30 MIN: CPT

## 2023-08-11 PROCEDURE — 99213 OFFICE O/P EST LOW 20 MIN: CPT

## 2023-08-11 RX ORDER — DICLOFENAC POTASSIUM 50 MG/1
50 TABLET, COATED ORAL TWICE DAILY
Qty: 14 | Refills: 1 | Status: ACTIVE | COMMUNITY
Start: 2023-08-11 | End: 1900-01-01

## 2023-08-11 RX ORDER — BECLOMETHASONE DIPROPIONATE HFA 80 UG/1
80 AEROSOL, METERED RESPIRATORY (INHALATION)
Refills: 0 | Status: COMPLETED | COMMUNITY
End: 2023-08-11

## 2023-08-11 RX ORDER — ALBUTEROL SULFATE 90 UG/1
108 (90 BASE) INHALANT RESPIRATORY (INHALATION)
Refills: 0 | Status: COMPLETED | COMMUNITY
End: 2023-08-11

## 2023-08-11 NOTE — REASON FOR VISIT
[FreeTextEntry2] : Patient complains of R Wrist pain. No injury or trauma, Pain radiates into the thumb. Sensitive to the touch //////// Patient complains of some recently increasing pain in the right wrist.  She reports that this is happened a few times in the past and calm down with use of a splint and medication.  Examination of the right wrist today reveals some swelling on the dorsal aspect of the dorsal aspect of her wrist.  Full range of motion noted in all fingers and neurovascular status is grossly intact both Phalen's and Tinel's are negative for carpal tunnel.

## 2023-08-11 NOTE — HISTORY OF PRESENT ILLNESS
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

## 2023-08-11 NOTE — DATA REVIEWED
[FreeTextEntry1] : Three-view x-rays of the wrist are performed today and are grossly negative for any acute pathology.

## 2023-08-11 NOTE — ASSESSMENT
[FreeTextEntry1] : Current recommendation is for a combination of splint icing Cataflam 50 mg twice a day to alleviate her symptoms.  She should return in 2 weeks or sooner if her symptoms get worse for a possible cortisone injection she should also avoid any exacerbating activities regarding the wrist.

## 2023-08-20 ENCOUNTER — NON-APPOINTMENT (OUTPATIENT)
Age: 28
End: 2023-08-20

## 2023-08-23 ENCOUNTER — APPOINTMENT (OUTPATIENT)
Dept: OTOLARYNGOLOGY | Facility: CLINIC | Age: 28
End: 2023-08-23

## 2023-08-28 ENCOUNTER — APPOINTMENT (OUTPATIENT)
Dept: OBGYN | Facility: CLINIC | Age: 28
End: 2023-08-28
Payer: COMMERCIAL

## 2023-08-28 ENCOUNTER — RESULT REVIEW (OUTPATIENT)
Age: 28
End: 2023-08-28

## 2023-08-28 VITALS
SYSTOLIC BLOOD PRESSURE: 122 MMHG | BODY MASS INDEX: 24.35 KG/M2 | DIASTOLIC BLOOD PRESSURE: 83 MMHG | WEIGHT: 129 LBS | HEIGHT: 61 IN

## 2023-08-28 DIAGNOSIS — Z01.419 ENCOUNTER FOR GYNECOLOGICAL EXAMINATION (GENERAL) (ROUTINE) W/OUT ABNORMAL FINDINGS: ICD-10-CM

## 2023-08-28 PROCEDURE — 99395 PREV VISIT EST AGE 18-39: CPT

## 2023-08-28 NOTE — HISTORY OF PRESENT ILLNESS
[FreeTextEntry1] : 26 yo  , here for av. She needs ocp refill. Her periods are regular, not heavy. She has a h/o right breast fibroadenoma. S  Family h/o an aunt with breast and ovarian cancer- rec genetic testing

## 2023-08-29 ENCOUNTER — NON-APPOINTMENT (OUTPATIENT)
Age: 28
End: 2023-08-29

## 2023-08-29 ENCOUNTER — OFFICE (OUTPATIENT)
Dept: URBAN - METROPOLITAN AREA CLINIC 112 | Facility: CLINIC | Age: 28
Setting detail: OPHTHALMOLOGY
End: 2023-08-29
Payer: COMMERCIAL

## 2023-08-29 ENCOUNTER — RX ONLY (RX ONLY)
Age: 28
End: 2023-08-29

## 2023-08-29 DIAGNOSIS — H43.393: ICD-10-CM

## 2023-08-29 DIAGNOSIS — H04.123: ICD-10-CM

## 2023-08-29 DIAGNOSIS — H40.013: ICD-10-CM

## 2023-08-29 DIAGNOSIS — H52.13: ICD-10-CM

## 2023-08-29 PROCEDURE — 92015 DETERMINE REFRACTIVE STATE: CPT | Performed by: OPHTHALMOLOGY

## 2023-08-29 PROCEDURE — 92014 COMPRE OPH EXAM EST PT 1/>: CPT | Performed by: OPHTHALMOLOGY

## 2023-08-29 PROCEDURE — 92250 FUNDUS PHOTOGRAPHY W/I&R: CPT | Performed by: OPHTHALMOLOGY

## 2023-08-29 ASSESSMENT — REFRACTION_MANIFEST
OU_VA: 20/20
OS_HPRISM: 1.5
OD_SPHERE: -1.75
OD_VA1: 20/20
OD_SPHERE: -1.50
OD_AXIS: 120
OD_VA1: 20/20
OS_CYLINDER: -0.50
OS_CYLINDER: -0.25
OD_VPRISM_DIRECTION: BI
OD_CYLINDER: -1.50
OS_VA1: 20/20
OS_SPHERE: -0.25
OD_AXIS: 120
OS_AXIS: 130
OS_VA1: 20/20
OS_SPHERE: -0.75
OD_HPRISM: 1.5
OS_AXIS: 120
OD_CYLINDER: -1.50
OS_VPRISM_DIRECTION: BI

## 2023-08-29 ASSESSMENT — VISUAL ACUITY
OS_BCVA: 20/20-1
OD_BCVA: 20/20-1

## 2023-08-29 ASSESSMENT — REFRACTION_CURRENTRX
OD_CYLINDER: -1.50
OD_SPHERE: -1.50
OS_AXIS: 108
OS_CYLINDER: -0.50
OS_OVR_VA: 20/
OS_AXIS: 126
OD_OVR_VA: 20/
OS_CYLINDER: -0.25
OS_SPHERE: -0.25
OD_AXIS: 116
OD_CYLINDER: -1.50
OS_VPRISM_DIRECTION: SV
OS_SPHERE: -0.75
OD_SPHERE: -1.50
OD_OVR_VA: 20/
OD_VPRISM_DIRECTION: SV
OD_AXIS: 125
OS_OVR_VA: 20/

## 2023-08-29 ASSESSMENT — REFRACTION_AUTOREFRACTION
OS_SPHERE: -0.75
OD_SPHERE: -2.00
OD_CYLINDER: -1.75
OD_AXIS: 117
OS_CYLINDER: -0.50
OS_AXIS: 123

## 2023-08-29 ASSESSMENT — KERATOMETRY
OD_K1POWER_DIOPTERS: 44.25
OS_K1POWER_DIOPTERS: 44.25
OD_AXISANGLE_DEGREES: 053
OS_K2POWER_DIOPTERS: 44.75
OS_AXISANGLE_DEGREES: 093
OD_K2POWER_DIOPTERS: 45.50

## 2023-08-29 ASSESSMENT — AXIALLENGTH_DERIVED
OS_AL: 23.6142
OD_AL: 24.0704
OS_AL: 23.5655
OD_AL: 24.2234
OD_AL: 23.9694
OS_AL: 23.4207

## 2023-08-29 ASSESSMENT — CONFRONTATIONAL VISUAL FIELD TEST (CVF)
OS_FINDINGS: FULL
OD_FINDINGS: FULL

## 2023-08-29 ASSESSMENT — TONOMETRY
OS_IOP_MMHG: 15
OD_IOP_MMHG: 14

## 2023-08-29 ASSESSMENT — SPHEQUIV_DERIVED
OD_SPHEQUIV: -2.5
OS_SPHEQUIV: -0.875
OS_SPHEQUIV: -0.5
OS_SPHEQUIV: -1
OD_SPHEQUIV: -2.25
OD_SPHEQUIV: -2.875

## 2023-09-01 LAB
C TRACH RRNA SPEC QL NAA+PROBE: NOT DETECTED
CYTOLOGY CVX/VAG DOC THIN PREP: NORMAL
N GONORRHOEA RRNA SPEC QL NAA+PROBE: NOT DETECTED
SOURCE TP AMPLIFICATION: NORMAL

## 2023-09-12 RX ORDER — FLUTICASONE FUROATE AND VILANTEROL TRIFENATATE 200; 25 UG/1; UG/1
200-25 POWDER RESPIRATORY (INHALATION)
Qty: 3 | Refills: 0 | Status: ACTIVE | COMMUNITY
Start: 2023-08-08 | End: 1900-01-01

## 2023-09-12 RX ORDER — DROSPIRENONE AND ETHINYL ESTRADIOL 0.03MG-3MG
3-0.03 KIT ORAL DAILY
Qty: 90 | Refills: 3 | Status: ACTIVE | COMMUNITY
Start: 2022-08-27 | End: 1900-01-01

## 2023-09-22 ENCOUNTER — NON-APPOINTMENT (OUTPATIENT)
Age: 28
End: 2023-09-22

## 2023-10-25 LAB — TOTAL IGE SMQN RAST: 265 KU/L

## 2023-10-26 ENCOUNTER — RESULT REVIEW (OUTPATIENT)
Age: 28
End: 2023-10-26

## 2023-10-26 ENCOUNTER — APPOINTMENT (OUTPATIENT)
Dept: ULTRASOUND IMAGING | Facility: CLINIC | Age: 28
End: 2023-10-26
Payer: COMMERCIAL

## 2023-10-26 ENCOUNTER — OUTPATIENT (OUTPATIENT)
Dept: OUTPATIENT SERVICES | Facility: HOSPITAL | Age: 28
LOS: 1 days | End: 2023-10-26
Payer: COMMERCIAL

## 2023-10-26 DIAGNOSIS — Z98.890 OTHER SPECIFIED POSTPROCEDURAL STATES: Chronic | ICD-10-CM

## 2023-10-26 DIAGNOSIS — D24.1 BENIGN NEOPLASM OF RIGHT BREAST: ICD-10-CM

## 2023-10-26 PROCEDURE — 76642 ULTRASOUND BREAST LIMITED: CPT | Mod: 26,RT

## 2023-10-26 PROCEDURE — 76642 ULTRASOUND BREAST LIMITED: CPT

## 2023-10-27 ENCOUNTER — APPOINTMENT (OUTPATIENT)
Dept: PULMONOLOGY | Facility: CLINIC | Age: 28
End: 2023-10-27
Payer: COMMERCIAL

## 2023-10-27 VITALS — HEIGHT: 61.5 IN | WEIGHT: 125 LBS | BODY MASS INDEX: 23.3 KG/M2

## 2023-10-27 PROCEDURE — 94010 BREATHING CAPACITY TEST: CPT

## 2023-10-27 PROCEDURE — 85018 HEMOGLOBIN: CPT | Mod: QW

## 2023-10-27 PROCEDURE — 94727 GAS DIL/WSHOT DETER LNG VOL: CPT

## 2023-10-27 PROCEDURE — 94729 DIFFUSING CAPACITY: CPT

## 2023-10-30 LAB
A FLAVUS AB FLD QL: NEGATIVE
A FUMIGATUS AB FLD QL: NEGATIVE
A NIGER AB FLD QL: NEGATIVE

## 2023-11-14 ENCOUNTER — APPOINTMENT (OUTPATIENT)
Dept: PULMONOLOGY | Facility: CLINIC | Age: 28
End: 2023-11-14
Payer: COMMERCIAL

## 2023-11-14 DIAGNOSIS — R06.2 WHEEZING: ICD-10-CM

## 2023-11-14 PROCEDURE — 99443: CPT

## 2023-11-21 ENCOUNTER — OFFICE (OUTPATIENT)
Dept: URBAN - METROPOLITAN AREA CLINIC 112 | Facility: CLINIC | Age: 28
Setting detail: OPHTHALMOLOGY
End: 2023-11-21
Payer: COMMERCIAL

## 2023-11-21 DIAGNOSIS — H40.013: ICD-10-CM

## 2023-11-21 DIAGNOSIS — H43.393: ICD-10-CM

## 2023-11-21 DIAGNOSIS — H04.123: ICD-10-CM

## 2023-11-21 PROCEDURE — 76514 ECHO EXAM OF EYE THICKNESS: CPT | Performed by: OPHTHALMOLOGY

## 2023-11-21 PROCEDURE — 92012 INTRM OPH EXAM EST PATIENT: CPT | Performed by: OPHTHALMOLOGY

## 2023-11-21 PROCEDURE — 92133 CPTRZD OPH DX IMG PST SGM ON: CPT | Performed by: OPHTHALMOLOGY

## 2023-11-21 PROCEDURE — 92083 EXTENDED VISUAL FIELD XM: CPT | Performed by: OPHTHALMOLOGY

## 2023-11-21 ASSESSMENT — REFRACTION_MANIFEST
OS_CYLINDER: -0.50
OU_VA: 20/20
OD_AXIS: 120
OD_VPRISM_DIRECTION: BI
OD_SPHERE: -1.75
OS_VPRISM_DIRECTION: BI
OD_VA1: 20/20
OD_VA1: 20/20
OS_CYLINDER: -0.25
OS_AXIS: 130
OS_AXIS: 120
OD_AXIS: 120
OD_CYLINDER: -1.50
OD_SPHERE: -1.50
OS_SPHERE: -0.25
OS_VA1: 20/20
OS_VA1: 20/20
OS_SPHERE: -0.75
OS_HPRISM: 1.5
OD_CYLINDER: -1.50
OD_HPRISM: 1.5

## 2023-11-21 ASSESSMENT — SPHEQUIV_DERIVED
OS_SPHEQUIV: -0.5
OD_SPHEQUIV: -2.5
OS_SPHEQUIV: -1.125
OD_SPHEQUIV: -2.5
OD_SPHEQUIV: -2.25
OS_SPHEQUIV: -0.875

## 2023-11-21 ASSESSMENT — REFRACTION_CURRENTRX
OD_CYLINDER: -1.50
OS_AXIS: 126
OD_CYLINDER: -1.50
OS_VPRISM_DIRECTION: SV
OD_VPRISM_DIRECTION: SV
OD_OVR_VA: 20/
OD_SPHERE: -1.50
OS_AXIS: 108
OD_AXIS: 125
OS_CYLINDER: -0.50
OS_OVR_VA: 20/
OD_OVR_VA: 20/
OS_SPHERE: -0.75
OS_OVR_VA: 20/
OD_SPHERE: -1.50
OD_AXIS: 116
OS_CYLINDER: -0.25
OS_SPHERE: -0.25

## 2023-11-21 ASSESSMENT — CONFRONTATIONAL VISUAL FIELD TEST (CVF)
OS_FINDINGS: FULL
OD_FINDINGS: FULL

## 2023-11-21 ASSESSMENT — REFRACTION_AUTOREFRACTION
OS_AXIS: 121
OD_AXIS: 110
OD_CYLINDER: -1.50
OD_SPHERE: -1.75
OS_SPHERE: -1.00
OS_CYLINDER: -0.25

## 2023-11-23 ENCOUNTER — NON-APPOINTMENT (OUTPATIENT)
Age: 28
End: 2023-11-23

## 2024-01-10 ENCOUNTER — APPOINTMENT (OUTPATIENT)
Dept: OTOLARYNGOLOGY | Facility: CLINIC | Age: 29
End: 2024-01-10

## 2024-02-20 ENCOUNTER — OFFICE (OUTPATIENT)
Dept: URBAN - METROPOLITAN AREA CLINIC 115 | Facility: CLINIC | Age: 29
Setting detail: OPHTHALMOLOGY
End: 2024-02-20
Payer: COMMERCIAL

## 2024-02-20 DIAGNOSIS — H10.45: ICD-10-CM

## 2024-02-20 PROCEDURE — 92012 INTRM OPH EXAM EST PATIENT: CPT | Performed by: OPHTHALMOLOGY

## 2024-02-20 ASSESSMENT — REFRACTION_CURRENTRX
OS_SPHERE: -0.25
OS_SPHERE: -0.75
OD_OVR_VA: 20/
OS_OVR_VA: 20/
OD_CYLINDER: -1.50
OS_CYLINDER: -0.25
OD_CYLINDER: -1.50
OD_OVR_VA: 20/
OS_CYLINDER: -0.50
OS_AXIS: 108
OD_VPRISM_DIRECTION: SV
OD_AXIS: 116
OD_AXIS: 125
OS_AXIS: 126
OD_SPHERE: -1.50
OS_VPRISM_DIRECTION: SV
OS_OVR_VA: 20/
OD_SPHERE: -1.50

## 2024-02-20 ASSESSMENT — REFRACTION_MANIFEST
OS_SPHERE: -0.25
OD_SPHERE: -1.75
OD_AXIS: 120
OD_VA1: 20/20
OD_VPRISM_DIRECTION: BI
OS_SPHERE: -0.75
OS_CYLINDER: -0.50
OS_VPRISM_DIRECTION: BI
OD_HPRISM: 1.5
OD_SPHERE: -1.50
OU_VA: 20/20
OS_AXIS: 130
OD_AXIS: 120
OS_VA1: 20/20
OD_CYLINDER: -1.50
OD_CYLINDER: -1.50
OS_HPRISM: 1.5
OD_VA1: 20/20
OS_AXIS: 120
OS_CYLINDER: -0.25
OS_VA1: 20/20

## 2024-02-20 ASSESSMENT — CONFRONTATIONAL VISUAL FIELD TEST (CVF)
OD_FINDINGS: FULL
OS_FINDINGS: FULL

## 2024-02-20 ASSESSMENT — REFRACTION_AUTOREFRACTION
OD_CYLINDER: -1.50
OS_SPHERE: -1.00
OS_CYLINDER: -0.25
OD_AXIS: 110
OS_AXIS: 121
OD_SPHERE: -1.75

## 2024-02-20 ASSESSMENT — SPHEQUIV_DERIVED
OD_SPHEQUIV: -2.25
OD_SPHEQUIV: -2.5
OD_SPHEQUIV: -2.5
OS_SPHEQUIV: -0.875
OS_SPHEQUIV: -0.5
OS_SPHEQUIV: -1.125

## 2024-04-30 ENCOUNTER — APPOINTMENT (OUTPATIENT)
Dept: OBGYN | Facility: CLINIC | Age: 29
End: 2024-04-30
Payer: COMMERCIAL

## 2024-04-30 VITALS
DIASTOLIC BLOOD PRESSURE: 70 MMHG | SYSTOLIC BLOOD PRESSURE: 110 MMHG | HEIGHT: 61.5 IN | BODY MASS INDEX: 23.3 KG/M2 | WEIGHT: 125 LBS

## 2024-04-30 DIAGNOSIS — L98.9 DISORDER OF THE SKIN AND SUBCUTANEOUS TISSUE, UNSPECIFIED: ICD-10-CM

## 2024-04-30 DIAGNOSIS — L02.91 CUTANEOUS ABSCESS, UNSPECIFIED: ICD-10-CM

## 2024-04-30 PROCEDURE — 99213 OFFICE O/P EST LOW 20 MIN: CPT

## 2024-04-30 RX ORDER — ERYTHROMYCIN 20 MG/ML
2 SOLUTION TOPICAL TWICE DAILY
Qty: 1 | Refills: 3 | Status: ACTIVE | COMMUNITY
Start: 2024-04-30 | End: 1900-01-01

## 2024-04-30 NOTE — PHYSICAL EXAM
[Chaperone Declined] : Patient declined chaperone [Appropriately responsive] : appropriately responsive [Alert] : alert [No Acute Distress] : no acute distress [No Lymphadenopathy] : no lymphadenopathy [Oriented x3] : oriented x3 [Examination Of The Breasts] : a normal appearance [Normal] : normal [No Masses] : no breast masses were palpable [FreeTextEntry6] : 1cm dryed up small abscess. slightly erythematous and nontender today

## 2024-04-30 NOTE — HISTORY OF PRESENT ILLNESS
[FreeTextEntry1] : 29 yo noticed a bump on her right breast at 6oclock  five days ago, on the second day it popped and was sore and painfull, she has had some itching since than and its drying up she is using bacitracin.  SHe has a fhx on both grandmothers of breast canceron was 40.

## 2024-04-30 NOTE — DISCUSSION/SUMMARY
[FreeTextEntry1] : disc warm compresses disc prevention(she has had one in the genital area) erythromycin solution after shaving and showeres sent rx for emycin disc becasue of fhx to have her first mammo at the age of 30.

## 2024-06-08 ENCOUNTER — NON-APPOINTMENT (OUTPATIENT)
Age: 29
End: 2024-06-08

## 2024-06-12 ENCOUNTER — APPOINTMENT (OUTPATIENT)
Dept: OBGYN | Facility: CLINIC | Age: 29
End: 2024-06-12
Payer: COMMERCIAL

## 2024-06-12 VITALS
SYSTOLIC BLOOD PRESSURE: 100 MMHG | BODY MASS INDEX: 23.3 KG/M2 | DIASTOLIC BLOOD PRESSURE: 60 MMHG | WEIGHT: 125 LBS | HEIGHT: 61.5 IN

## 2024-06-12 DIAGNOSIS — R22.30 LOCALIZED SWELLING, MASS AND LUMP, UNSPECIFIED UPPER LIMB: ICD-10-CM

## 2024-06-12 PROCEDURE — 99212 OFFICE O/P EST SF 10 MIN: CPT

## 2024-06-12 PROCEDURE — 99459 PELVIC EXAMINATION: CPT

## 2024-06-12 NOTE — HISTORY OF PRESENT ILLNESS
[FreeTextEntry1] : 28 year old female presents for acute appointment due to left axilla lump that the patient states over the past week has increased in size. Pt. denies illness, nipple discharge and breast pain.

## 2024-06-12 NOTE — PHYSICAL EXAM
[Chaperone Present] : A chaperone was present in the examining room during all aspects of the physical examination [[id number]] : A chaperone was present during the pelvic exam. [Examination Of The Breasts] : a normal appearance [Normal] : normal [No Masses] : no breast masses were palpable

## 2024-06-12 NOTE — PLAN
[FreeTextEntry1] : Axillary mass   - Fixed mass around 1.5 cm. Reassurance provided  - Imaging sent will call patient with results   All questions and concerns addressed during encounter. Pt. agreed to plan of care.  F/U PRN

## 2024-06-13 ENCOUNTER — NON-APPOINTMENT (OUTPATIENT)
Age: 29
End: 2024-06-13

## 2024-06-13 ENCOUNTER — APPOINTMENT (OUTPATIENT)
Dept: CARDIOLOGY | Facility: CLINIC | Age: 29
End: 2024-06-13
Payer: COMMERCIAL

## 2024-06-13 VITALS
SYSTOLIC BLOOD PRESSURE: 92 MMHG | HEIGHT: 61.5 IN | DIASTOLIC BLOOD PRESSURE: 58 MMHG | OXYGEN SATURATION: 98 % | BODY MASS INDEX: 23.11 KG/M2 | WEIGHT: 124 LBS | HEART RATE: 99 BPM

## 2024-06-13 VITALS — SYSTOLIC BLOOD PRESSURE: 98 MMHG | DIASTOLIC BLOOD PRESSURE: 58 MMHG

## 2024-06-13 DIAGNOSIS — R00.2 PALPITATIONS: ICD-10-CM

## 2024-06-13 PROCEDURE — 93000 ELECTROCARDIOGRAM COMPLETE: CPT

## 2024-06-13 PROCEDURE — 99204 OFFICE O/P NEW MOD 45 MIN: CPT

## 2024-06-13 PROCEDURE — G2211 COMPLEX E/M VISIT ADD ON: CPT

## 2024-06-13 NOTE — DISCUSSION/SUMMARY
[FreeTextEntry1] : 28F h/o asthma, chronic palpitations with recent worsening, presents for cardiology evaluation   Recent worsening palpitations had normal outside cardiac workup 8 months ago, repeat EKG no arrhythmia, will obtain 72hrs Holter to exclude and consider check TFT if tachyarrhythmia, but likely nonarrhythmic related and symptoms may gradually go away, but if persist and no event on 72hrs Holter then may need 2 weeks extended monitor.     Follow up pending result of Holter result.  [EKG obtained to assist in diagnosis and management of assessed problem(s)] : EKG obtained to assist in diagnosis and management of assessed problem(s)

## 2024-06-18 ENCOUNTER — NON-APPOINTMENT (OUTPATIENT)
Age: 29
End: 2024-06-18

## 2024-06-19 ENCOUNTER — NON-APPOINTMENT (OUTPATIENT)
Age: 29
End: 2024-06-19

## 2024-06-19 ENCOUNTER — APPOINTMENT (OUTPATIENT)
Dept: OTOLARYNGOLOGY | Facility: CLINIC | Age: 29
End: 2024-06-19
Payer: COMMERCIAL

## 2024-06-19 VITALS
HEART RATE: 90 BPM | HEIGHT: 61 IN | SYSTOLIC BLOOD PRESSURE: 108 MMHG | DIASTOLIC BLOOD PRESSURE: 77 MMHG | WEIGHT: 128 LBS | BODY MASS INDEX: 24.17 KG/M2

## 2024-06-19 DIAGNOSIS — J31.0 CHRONIC RHINITIS: ICD-10-CM

## 2024-06-19 DIAGNOSIS — J32.9 CHRONIC SINUSITIS, UNSPECIFIED: ICD-10-CM

## 2024-06-19 DIAGNOSIS — J34.2 DEVIATED NASAL SEPTUM: ICD-10-CM

## 2024-06-19 PROCEDURE — 99204 OFFICE O/P NEW MOD 45 MIN: CPT | Mod: 25

## 2024-06-19 PROCEDURE — 31231 NASAL ENDOSCOPY DX: CPT

## 2024-06-19 RX ORDER — AMOXICILLIN AND CLAVULANATE POTASSIUM 875; 125 MG/1; MG/1
875-125 TABLET, COATED ORAL
Qty: 20 | Refills: 0 | Status: ACTIVE | COMMUNITY
Start: 2024-06-19 | End: 1900-01-01

## 2024-06-19 RX ORDER — FLUTICASONE PROPIONATE 50 UG/1
50 SPRAY, METERED NASAL TWICE DAILY
Qty: 1 | Refills: 1 | Status: ACTIVE | COMMUNITY
Start: 2024-06-19 | End: 1900-01-01

## 2024-06-19 NOTE — ASSESSMENT
[FreeTextEntry1] : Atif Min presents for evaluation. She has history of allergic rhinitis s/p previous immunotherapy and chronic sinusitis with recurrent infections. She has chronic right eye diplopia due to astigmatism, improved with her glasses. 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 We will start maximal medical therapy then obtain CT sinus for possible surgical planning.  - start augmentin x 10 days. Side effects were discussed and include but are not limited to nausea, vomiting, diarrhea, and skin rash. - start nasal saline sprays TID x 3 weeks. - start fluticasone 2 sprays BID x 3 weeks. - CT sinus in 3 weeks. - f/u after CT

## 2024-06-19 NOTE — HISTORY OF PRESENT ILLNESS
[de-identified] : Atif Min is a 27 yo female with hx asthma who presents for evaluation of sinonasal issues. She has had three episodes of sinusitis in the past year. She has had increased nasal congestion, rhinorrhea, and postnasal drainage. She denies chronic sinus pressure. She has had previous positive allergy testing and immunotherapy. She denies vision changes or pain/restriction of extraocular movements. She notes chronic issues with right diplopia due to astigmatism. She denies recent fevers or chills. She does not use nasal sprays.  She had recurrent ear infections but this has improved. She has seen cardiology and has an arrythmia.She is followed by pulmonology.

## 2024-06-19 NOTE — REVIEW OF SYSTEMS
[Sneezing] : sneezing [Seasonal Allergies] : seasonal allergies [Post Nasal Drip] : post nasal drip [Ear Pain] : ear pain [Lightheadedness] : lightheadedness [Ear Noises] : ear noises [Nasal Congestion] : nasal congestion [Recurrent Sinus Infections] : recurrent sinus infections [Sinus Pain] : sinus pain [Sinus Pressure] : sinus pressure [Sense Of Smell Problem] : sense of smell problem [Discolored Nasal Discharge] : discolored nasal discharge [Eyes Itch] : itching of the eyes [Chest Pain] : chest pain [Palpitations] : palpitations [Heartburn] : heartburn [Joint Pain] : joint pain [Itching] : itching [Swollen Glands] : swollen glands [Negative] : Endocrine [FreeTextEntry6] : noisy breathing [FreeTextEntry1] : headaches, fatigue, daytime sleepiness, sweating at night, muscle aches, hives

## 2024-06-20 ENCOUNTER — APPOINTMENT (OUTPATIENT)
Dept: PULMONOLOGY | Facility: CLINIC | Age: 29
End: 2024-06-20
Payer: COMMERCIAL

## 2024-06-20 ENCOUNTER — APPOINTMENT (OUTPATIENT)
Dept: ULTRASOUND IMAGING | Facility: CLINIC | Age: 29
End: 2024-06-20
Payer: COMMERCIAL

## 2024-06-20 ENCOUNTER — OUTPATIENT (OUTPATIENT)
Dept: OUTPATIENT SERVICES | Facility: HOSPITAL | Age: 29
LOS: 1 days | End: 2024-06-20
Payer: COMMERCIAL

## 2024-06-20 VITALS
HEART RATE: 78 BPM | SYSTOLIC BLOOD PRESSURE: 110 MMHG | RESPIRATION RATE: 16 BRPM | HEIGHT: 61 IN | DIASTOLIC BLOOD PRESSURE: 68 MMHG | OXYGEN SATURATION: 98 % | WEIGHT: 125 LBS | BODY MASS INDEX: 23.6 KG/M2

## 2024-06-20 DIAGNOSIS — R76.8 OTHER SPECIFIED ABNORMAL IMMUNOLOGICAL FINDINGS IN SERUM: ICD-10-CM

## 2024-06-20 DIAGNOSIS — Z00.8 ENCOUNTER FOR OTHER GENERAL EXAMINATION: ICD-10-CM

## 2024-06-20 DIAGNOSIS — R22.30 LOCALIZED SWELLING, MASS AND LUMP, UNSPECIFIED UPPER LIMB: ICD-10-CM

## 2024-06-20 DIAGNOSIS — Z98.890 OTHER SPECIFIED POSTPROCEDURAL STATES: Chronic | ICD-10-CM

## 2024-06-20 DIAGNOSIS — T78.40XA ALLERGY, UNSPECIFIED, INITIAL ENCOUNTER: ICD-10-CM

## 2024-06-20 DIAGNOSIS — R07.89 OTHER CHEST PAIN: ICD-10-CM

## 2024-06-20 DIAGNOSIS — J45.909 UNSPECIFIED ASTHMA, UNCOMPLICATED: ICD-10-CM

## 2024-06-20 PROCEDURE — 99214 OFFICE O/P EST MOD 30 MIN: CPT | Mod: 25

## 2024-06-20 PROCEDURE — 76882 US LMTD JT/FCL EVL NVASC XTR: CPT | Mod: 26,LT

## 2024-06-20 PROCEDURE — 76882 US LMTD JT/FCL EVL NVASC XTR: CPT

## 2024-06-20 PROCEDURE — 94010 BREATHING CAPACITY TEST: CPT

## 2024-06-20 RX ORDER — BUDESONIDE, GLYCOPYRROLATE, AND FORMOTEROL FUMARATE 160; 9; 4.8 UG/1; UG/1; UG/1
160-9-4.8 AEROSOL, METERED RESPIRATORY (INHALATION) TWICE DAILY
Qty: 3 | Refills: 3 | Status: ACTIVE | COMMUNITY
Start: 2024-06-20 | End: 1900-01-01

## 2024-06-20 NOTE — HISTORY OF PRESENT ILLNESS
[Never] : never [TextBox_4] : Dx with asthma about 2020.  Was seeing an allergist Dr Mckinley. 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   On initial consult here in 8/2023 was on qvar for the past 2.5 years with albuterol prn. Summer of 2023, she had sob, tightness, could not get in air. Albuterol was not helping. Went to [facility] ER 6/25/23; gave steroids, neb; cxr normal;  and d/c from ER. Continued symptoms. Could not get breath in. Went back to ER; given steroids, nebs, Mg and d/c from ER. Improved but still some sob, can't get air in.   On latest visit here in 11/2023 was doing better on breo. However, the past few weeks has began with COWART, palpitations, tightness and wheeze. At one point, got a neb trt at work in the hospital and it helped. ALbuterol helps for the moment. Usually issues in hotter weather.  Has epatch monitor from Dr Aparicio.  Saw ENT; CT planned.   PFT done 10/27/23 was WNL.   Labwork reviewed below.   Saw cardiologist at [facility] for chest tightness. Saw them last in May 2023. Told nader.   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   Had a dog; reports no allergies to dog, mainly dust.

## 2024-06-20 NOTE — PLAN
[TextEntry] : Trial of breztri instead of breo. Gave sample. Rinse mouth after use. Albuterol prn.  Can consider xolair or tezspire if difficult to control asthma and cardio w/u negative. Continue cardio eval.  Allergy f/u and trt. Annual flu vaccine. Reviewed good asthma control. Further reccs will come.

## 2024-06-20 NOTE — PROCEDURE
[FreeTextEntry1] : aaron done today 6/20/24: normal  PFT done 10/27/23: no obstruction ---------- ACC: [id number] EXAM: XR CHEST PA LAT 2V ORDERED BY: MITCHELL GOOD  PROCEDURE DATE: 06/25/2023    INTERPRETATION: INDICATION: Short of breath  PA and lateral chest  COMPARISON: 12/25/2021  FINDINGS: Heart/Vascular: The heart size, mediastinum, hilum and aorta are within normal limits for projection. Pulmonary: Midline trachea. There is no focal infiltrate, congestion or effusion. Bones: There is no fracture. Lines and catheter: None  Impression:  No acute pulmonary disease.  --- End of Report ---      CECE MONROY DO; Attending Radiologist This document has been electronically signed. Jun 26 2023 10:49AM

## 2024-07-05 ENCOUNTER — OUTPATIENT (OUTPATIENT)
Dept: OUTPATIENT SERVICES | Facility: HOSPITAL | Age: 29
LOS: 1 days | End: 2024-07-05

## 2024-07-05 ENCOUNTER — APPOINTMENT (OUTPATIENT)
Dept: CT IMAGING | Facility: CLINIC | Age: 29
End: 2024-07-05
Payer: COMMERCIAL

## 2024-07-05 DIAGNOSIS — Z98.890 OTHER SPECIFIED POSTPROCEDURAL STATES: Chronic | ICD-10-CM

## 2024-07-05 DIAGNOSIS — J32.9 CHRONIC SINUSITIS, UNSPECIFIED: ICD-10-CM

## 2024-07-05 PROCEDURE — 70486 CT MAXILLOFACIAL W/O DYE: CPT | Mod: 26

## 2024-07-24 ENCOUNTER — APPOINTMENT (OUTPATIENT)
Dept: OTOLARYNGOLOGY | Facility: CLINIC | Age: 29
End: 2024-07-24
Payer: COMMERCIAL

## 2024-07-24 VITALS
BODY MASS INDEX: 24.17 KG/M2 | HEIGHT: 61 IN | DIASTOLIC BLOOD PRESSURE: 77 MMHG | SYSTOLIC BLOOD PRESSURE: 114 MMHG | HEART RATE: 84 BPM | WEIGHT: 128 LBS

## 2024-07-24 DIAGNOSIS — J31.0 CHRONIC RHINITIS: ICD-10-CM

## 2024-07-24 DIAGNOSIS — R09.81 NASAL CONGESTION: ICD-10-CM

## 2024-07-24 DIAGNOSIS — J34.2 DEVIATED NASAL SEPTUM: ICD-10-CM

## 2024-07-24 DIAGNOSIS — M95.0 ACQUIRED DEFORMITY OF NOSE: ICD-10-CM

## 2024-07-24 DIAGNOSIS — J34.3 HYPERTROPHY OF NASAL TURBINATES: ICD-10-CM

## 2024-07-24 PROCEDURE — 99214 OFFICE O/P EST MOD 30 MIN: CPT

## 2024-07-24 NOTE — PHYSICAL EXAM
[] : septum deviated to the right [Midline] : trachea located in midline position [Normal] : no rashes [de-identified] : Bilateral nasal valve collapse with positive liz maneuver. [de-identified] : Bilateral inferior turbintae hypertrophy.

## 2024-07-24 NOTE — DATA REVIEWED
[de-identified] : CT sinus 7/5/24: FINDINGS:  POST SURGICAL CHANGES: None.  NASAL SEPTUM / NASAL CAVITY / NASOPHARYNX: There is mild sigmoid deviation of the nasal septum.  Evaluation of the nasal cavity fails to demonstrate dominant masses.  The nasopharynx is unremarkable.  SINOCRANIAL AND SINOORBITAL JUNCTIONS: The lamina papyracea, cribriform plates, and fovea ethmoidalis are intact.  FRONTAL SINUS, DRAINAGE PATHWAYS, AND ASSOCIATED ANATOMIC VARIANTS: The frontal sinuses are well developed. The frontal sinuses are clear. The bilateral frontal sinus outflow tracts appear free of mucosal disease.  ETHMOID SINUSES: The ethmoid air cells demonstrate mild mucosal thickening.  SPHENOID SINUSES, DRAINAGE PATHWAYS, AND ASSOCIATED ANATOMIC VARIANTS: The sphenoid sinuses are well-developed. There is right-sided sellar extension of the sphenoid sinus.  The sphenoid sinuses are clear. The bilateral sphenoethmoidal recesses are free of abnormal soft tissue.  The bilateral carotid canals are covered by bone.  MAXILLARY SINUS, DRAINAGE PATHWAYS, AND ASSOCIATED ANATOMIC VARIANTS: The maxillary sinuses are well developed and demonstrate mild polypoid thickening.  OSTIOMEATAL UNITS: The ostiomeatal units are patent.  OTHER: Limited evaluation of the brain parenchyma demonstrates no abnormalities.  IMPRESSION:  Mild polypoid mucosal thickening of the maxillary sinuses. Mild sigmoid deviation of the nasal septum.

## 2024-07-24 NOTE — REVIEW OF SYSTEMS
[Seasonal Allergies] : seasonal allergies [Post Nasal Drip] : post nasal drip [Nasal Congestion] : nasal congestion [Sinus Pressure] : sinus pressure [Negative] : Heme/Lymph

## 2024-07-24 NOTE — HISTORY OF PRESENT ILLNESS
[de-identified] : Atif Min is a 29 yo female with hx asthma who presents for evaluation of sinonasal issues. She has had three episodes of sinusitis in the past year. She has had increased nasal congestion, rhinorrhea, and postnasal drainage. She denies chronic sinus pressure. She has had previous positive allergy testing and immunotherapy. She denies vision changes or pain/restriction of extraocular movements. She notes chronic issues with right diplopia due to astigmatism. She denies recent fevers or chills. She does not use nasal sprays.  She had recurrent ear infections but this has improved. She has seen cardiology and has an arrythmia.She is followed by pulmonology. [FreeTextEntry1] : 7/24/24 - Ms. Min presents for follow-up. She completed maximal medical therapy for sinusitis. She notes nasal congestion and sinus pressure. She notes rhinorrhea and postnasal drainage. No fevers or vision changes.

## 2024-07-24 NOTE — ASSESSMENT
[FreeTextEntry1] : Atif Min presents for follow-up. She has history of allergic rhinitis s/p previous immunotherapy and chronic sinusitis with recurrent infections. She has chronic right eye diplopia due to astigmatism, improved with her glasses. Sinonasal endoscopy was performed at last visit revealing septal deviation to right. She completed maximal medical therapy with augmentin and topical nasal regimen but has persistent symptoms. CT sinus was obtained and reviewed showing minimal sinus disease but septal deviation to right. On exam, she has bilateral nasal valve collapse and bilateral inferior turbinate hypertrophy.  We discussed bilateral Vivaer radiofrequency procedure for nasal valve repair and inferior turbinate reduction as well as septoplasty. She would like to start with in office procedure. Discussed that her rhinorrhea and postnasal drainage symptoms are likely due to allergy and she will continue to follow with her allergist.  R/b/a of Vivaer procedure were discussed. Risks include but are not limited to bleeding, infection, crusting, scarring, nasal swelling, persistence of symptoms, need for future procedure, need for possible septoplasty in future. She understands these risks and all questions were answered. She wishes to proceed.  - f/u for in office procedure.

## 2024-07-29 PROBLEM — N92.0 INTERMENSTRUAL SPOTTING: Status: ACTIVE | Noted: 2019-05-03

## 2024-08-06 ENCOUNTER — APPOINTMENT (OUTPATIENT)
Dept: OTOLARYNGOLOGY | Facility: CLINIC | Age: 29
End: 2024-08-06

## 2024-08-14 ENCOUNTER — APPOINTMENT (OUTPATIENT)
Dept: OBGYN | Facility: CLINIC | Age: 29
End: 2024-08-14

## 2024-09-03 ENCOUNTER — APPOINTMENT (OUTPATIENT)
Dept: OTOLARYNGOLOGY | Facility: CLINIC | Age: 29
End: 2024-09-03
Payer: COMMERCIAL

## 2024-09-03 VITALS
BODY MASS INDEX: 24.55 KG/M2 | HEIGHT: 61 IN | DIASTOLIC BLOOD PRESSURE: 78 MMHG | WEIGHT: 130 LBS | SYSTOLIC BLOOD PRESSURE: 118 MMHG | HEART RATE: 83 BPM

## 2024-09-03 DIAGNOSIS — J34.3 HYPERTROPHY OF NASAL TURBINATES: ICD-10-CM

## 2024-09-03 DIAGNOSIS — M95.0 ACQUIRED DEFORMITY OF NOSE: ICD-10-CM

## 2024-09-03 DIAGNOSIS — R09.81 NASAL CONGESTION: ICD-10-CM

## 2024-09-03 PROCEDURE — 30801 ABLATE INF TURBINATE SUPERF: CPT

## 2024-09-03 PROCEDURE — 30469Z: CUSTOM

## 2024-09-03 RX ORDER — MUPIROCIN 20 MG/G
2 OINTMENT TOPICAL
Qty: 1 | Refills: 1 | Status: ACTIVE | COMMUNITY
Start: 2024-09-03 | End: 1900-01-01

## 2024-09-03 RX ORDER — OFLOXACIN 3 MG/ML
0.3 SOLUTION/ DROPS OPHTHALMIC
Qty: 5 | Refills: 0 | Status: ACTIVE | COMMUNITY
Start: 2024-07-06

## 2024-09-03 RX ORDER — KETOCONAZOLE 20 MG/G
2 CREAM TOPICAL
Qty: 60 | Refills: 0 | Status: ACTIVE | COMMUNITY
Start: 2024-08-12

## 2024-09-03 NOTE — REASON FOR VISIT
[Subsequent Evaluation] : a subsequent evaluation for [FreeTextEntry2] : Guerda in office procedure

## 2024-09-03 NOTE — ASSESSMENT
[FreeTextEntry1] : Atif Min presents for follow-up. She has history of allergic rhinitis s/p previous immunotherapy and chronic sinusitis with recurrent infections. She has chronic right eye diplopia due to astigmatism, improved with her glasses. Sinonasal endoscopy was performed at last visit revealing septal deviation to right. She completed maximal medical therapy with augmentin and topical nasal regimen but has persistent symptoms. CT sinus was obtained and reviewed showing minimal sinus disease but septal deviation to right. On exam, she has bilateral nasal valve collapse and bilateral inferior turbinate hypertrophy.  We discussed bilateral Vivaer radiofrequency procedure for nasal valve repair and inferior turbinate reduction as well as septoplasty. She elected to start with in office procedure. She is s/p bilateral Vivaer procedure today. See above. She tolerated procedure well.   - start nasal saline sprays TID x 3 weeks. - start mupirocin ointment to both nostrils BID x 10 days. - sinus precautions for 1 week. - f/u in 3 weeks

## 2024-09-03 NOTE — PHYSICAL EXAM
[] : septum deviated to the right [Normal] : no abnormal secretions [de-identified] : Bilateral nasal valve collapse with positive liz maneuver. [de-identified] : Bilateral inferior turbintae hypertrophy.

## 2024-09-03 NOTE — HISTORY OF PRESENT ILLNESS
[de-identified] : Atif Min is a 29 yo female with hx asthma who presents for evaluation of sinonasal issues. She has had three episodes of sinusitis in the past year. She has had increased nasal congestion, rhinorrhea, and postnasal drainage. She denies chronic sinus pressure. She has had previous positive allergy testing and immunotherapy. She denies vision changes or pain/restriction of extraocular movements. She notes chronic issues with right diplopia due to astigmatism. She denies recent fevers or chills. She does not use nasal sprays.  She had recurrent ear infections but this has improved. She has seen cardiology and has an arrythmia.She is followed by pulmonology. [FreeTextEntry1] : 7/24/24 - Ms. Min presents for follow-up. She completed maximal medical therapy for sinusitis. She notes nasal congestion and sinus pressure. She notes rhinorrhea and postnasal drainage. No fevers or vision changes.  9/3/24 - Ms. Min presents for vivaer procedure. She has continued nasal congestion. No fevers.

## 2024-09-19 ENCOUNTER — APPOINTMENT (OUTPATIENT)
Dept: PULMONOLOGY | Facility: CLINIC | Age: 29
End: 2024-09-19

## 2024-09-25 ENCOUNTER — APPOINTMENT (OUTPATIENT)
Dept: OTOLARYNGOLOGY | Facility: CLINIC | Age: 29
End: 2024-09-25
Payer: COMMERCIAL

## 2024-09-25 VITALS
WEIGHT: 130 LBS | BODY MASS INDEX: 24.55 KG/M2 | HEART RATE: 79 BPM | SYSTOLIC BLOOD PRESSURE: 103 MMHG | HEIGHT: 61 IN | DIASTOLIC BLOOD PRESSURE: 70 MMHG

## 2024-09-25 DIAGNOSIS — J31.0 CHRONIC RHINITIS: ICD-10-CM

## 2024-09-25 DIAGNOSIS — J34.2 DEVIATED NASAL SEPTUM: ICD-10-CM

## 2024-09-25 PROCEDURE — 99213 OFFICE O/P EST LOW 20 MIN: CPT

## 2024-09-25 NOTE — PHYSICAL EXAM
[] : septum deviated to the right [de-identified] : Improved bilateral nasal valve motion. Bilateral crusting removed and mucosa is healing well. [de-identified] : Bilateral inferior turbinates reduced [Midline] : trachea located in midline position [Normal] : no rashes

## 2024-09-25 NOTE — ASSESSMENT
[FreeTextEntry1] : Atif Min presents for follow-up. She has history of allergic rhinitis s/p previous immunotherapy and chronic sinusitis with recurrent infections. She has chronic right eye diplopia due to astigmatism, improved with her glasses. Sinonasal endoscopy was performed at previous visit revealing septal deviation to right. She completed maximal medical therapy with augmentin and topical nasal regimen but had persistent symptoms. CT sinus was obtained and reviewed showing minimal sinus disease but septal deviation to right. On exam, she had bilateral nasal valve collapse and bilateral inferior turbinate hypertrophy. She is s/p bilateral Vivaer nasal valve repair and inferior turbinate reduction on 9/3/24. She has healed nicely from this and notes improvement in nasal breathing. Bilateral crusting was removed. Will monitor for now and if nasal obstruction recurs, can consider septoplasty.  - nasal saline sprays TID x 2 weeks. - f/u in 3 mo

## 2024-09-25 NOTE — HISTORY OF PRESENT ILLNESS
[de-identified] : Atif Min is a 29 yo female with hx asthma who presents for evaluation of sinonasal issues. She has had three episodes of sinusitis in the past year. She has had increased nasal congestion, rhinorrhea, and postnasal drainage. She denies chronic sinus pressure. She has had previous positive allergy testing and immunotherapy. She denies vision changes or pain/restriction of extraocular movements. She notes chronic issues with right diplopia due to astigmatism. She denies recent fevers or chills. She does not use nasal sprays.  She had recurrent ear infections but this has improved. She has seen cardiology and has an arrythmia.She is followed by pulmonology. [FreeTextEntry1] : 7/24/24 - Ms. Min presents for follow-up. She completed maximal medical therapy for sinusitis. She notes nasal congestion and sinus pressure. She notes rhinorrhea and postnasal drainage. No fevers or vision changes.  9/3/24 - Ms. Min presents for vivaer procedure. She has continued nasal congestion. No fevers.  9/25/24 - Ms. Trejo presents s/p vivaer nasal valve repair and inferior turbinate reduction on 9/3/24. She notes improvement in nasal breathing. She notes some rhinorrhea. She denies postnasal drainage or sinus pressure. She denies fevers.

## 2024-10-12 ENCOUNTER — NON-APPOINTMENT (OUTPATIENT)
Age: 29
End: 2024-10-12

## 2024-11-14 ENCOUNTER — APPOINTMENT (OUTPATIENT)
Dept: OTOLARYNGOLOGY | Facility: CLINIC | Age: 29
End: 2024-11-14
Payer: COMMERCIAL

## 2024-11-14 ENCOUNTER — APPOINTMENT (OUTPATIENT)
Dept: OBGYN | Facility: CLINIC | Age: 29
End: 2024-11-14
Payer: COMMERCIAL

## 2024-11-14 VITALS
HEIGHT: 61 IN | BODY MASS INDEX: 25.49 KG/M2 | WEIGHT: 135 LBS | SYSTOLIC BLOOD PRESSURE: 126 MMHG | DIASTOLIC BLOOD PRESSURE: 84 MMHG

## 2024-11-14 VITALS
BODY MASS INDEX: 24.92 KG/M2 | WEIGHT: 132 LBS | DIASTOLIC BLOOD PRESSURE: 85 MMHG | SYSTOLIC BLOOD PRESSURE: 128 MMHG | HEIGHT: 61 IN | HEART RATE: 90 BPM

## 2024-11-14 DIAGNOSIS — Z01.419 ENCOUNTER FOR GYNECOLOGICAL EXAMINATION (GENERAL) (ROUTINE) W/OUT ABNORMAL FINDINGS: ICD-10-CM

## 2024-11-14 DIAGNOSIS — R59.0 LOCALIZED ENLARGED LYMPH NODES: ICD-10-CM

## 2024-11-14 DIAGNOSIS — J34.2 DEVIATED NASAL SEPTUM: ICD-10-CM

## 2024-11-14 PROCEDURE — 99395 PREV VISIT EST AGE 18-39: CPT

## 2024-11-14 PROCEDURE — 99213 OFFICE O/P EST LOW 20 MIN: CPT

## 2024-11-14 RX ORDER — METHYLPREDNISOLONE 8 MG/1
TABLET ORAL
Refills: 0 | Status: ACTIVE | COMMUNITY

## 2024-11-18 ENCOUNTER — APPOINTMENT (OUTPATIENT)
Dept: ULTRASOUND IMAGING | Facility: CLINIC | Age: 29
End: 2024-11-18

## 2024-11-18 ENCOUNTER — OUTPATIENT (OUTPATIENT)
Dept: OUTPATIENT SERVICES | Facility: HOSPITAL | Age: 29
LOS: 1 days | End: 2024-11-18
Payer: COMMERCIAL

## 2024-11-18 DIAGNOSIS — Z98.890 OTHER SPECIFIED POSTPROCEDURAL STATES: Chronic | ICD-10-CM

## 2024-11-18 DIAGNOSIS — R59.0 LOCALIZED ENLARGED LYMPH NODES: ICD-10-CM

## 2024-11-18 PROCEDURE — 76536 US EXAM OF HEAD AND NECK: CPT

## 2024-11-18 PROCEDURE — 76536 US EXAM OF HEAD AND NECK: CPT | Mod: 26

## 2024-11-25 ENCOUNTER — APPOINTMENT (OUTPATIENT)
Dept: OTOLARYNGOLOGY | Facility: CLINIC | Age: 29
End: 2024-11-25
Payer: COMMERCIAL

## 2024-11-25 VITALS
BODY MASS INDEX: 25.49 KG/M2 | HEIGHT: 61 IN | DIASTOLIC BLOOD PRESSURE: 74 MMHG | WEIGHT: 135 LBS | OXYGEN SATURATION: 99 % | SYSTOLIC BLOOD PRESSURE: 132 MMHG | HEART RATE: 102 BPM

## 2024-11-25 PROCEDURE — 31575 DIAGNOSTIC LARYNGOSCOPY: CPT

## 2024-11-25 PROCEDURE — 99214 OFFICE O/P EST MOD 30 MIN: CPT | Mod: 25

## 2024-12-12 ENCOUNTER — OUTPATIENT (OUTPATIENT)
Dept: OUTPATIENT SERVICES | Facility: HOSPITAL | Age: 29
LOS: 1 days | End: 2024-12-12
Payer: COMMERCIAL

## 2024-12-12 ENCOUNTER — RESULT REVIEW (OUTPATIENT)
Age: 29
End: 2024-12-12

## 2024-12-12 ENCOUNTER — APPOINTMENT (OUTPATIENT)
Dept: ULTRASOUND IMAGING | Facility: CLINIC | Age: 29
End: 2024-12-12

## 2024-12-12 DIAGNOSIS — R22.30 LOCALIZED SWELLING, MASS AND LUMP, UNSPECIFIED UPPER LIMB: ICD-10-CM

## 2024-12-12 DIAGNOSIS — Z98.890 OTHER SPECIFIED POSTPROCEDURAL STATES: Chronic | ICD-10-CM

## 2024-12-12 PROCEDURE — 76641 ULTRASOUND BREAST COMPLETE: CPT | Mod: 26,50

## 2024-12-12 PROCEDURE — 76641 ULTRASOUND BREAST COMPLETE: CPT

## 2024-12-19 ENCOUNTER — APPOINTMENT (OUTPATIENT)
Dept: OTOLARYNGOLOGY | Facility: CLINIC | Age: 29
End: 2024-12-19
Payer: COMMERCIAL

## 2024-12-19 ENCOUNTER — APPOINTMENT (OUTPATIENT)
Dept: ULTRASOUND IMAGING | Facility: CLINIC | Age: 29
End: 2024-12-19
Payer: COMMERCIAL

## 2024-12-19 ENCOUNTER — OUTPATIENT (OUTPATIENT)
Dept: OUTPATIENT SERVICES | Facility: HOSPITAL | Age: 29
LOS: 1 days | End: 2024-12-19
Payer: COMMERCIAL

## 2024-12-19 VITALS
BODY MASS INDEX: 25.3 KG/M2 | SYSTOLIC BLOOD PRESSURE: 114 MMHG | DIASTOLIC BLOOD PRESSURE: 77 MMHG | WEIGHT: 134 LBS | HEART RATE: 99 BPM | HEIGHT: 61 IN

## 2024-12-19 DIAGNOSIS — Z00.8 ENCOUNTER FOR OTHER GENERAL EXAMINATION: ICD-10-CM

## 2024-12-19 DIAGNOSIS — J34.829 NASAL VALVE COLLAPSE, UNSPECIFIED: ICD-10-CM

## 2024-12-19 DIAGNOSIS — J31.0 CHRONIC RHINITIS: ICD-10-CM

## 2024-12-19 DIAGNOSIS — Z98.890 OTHER SPECIFIED POSTPROCEDURAL STATES: Chronic | ICD-10-CM

## 2024-12-19 PROCEDURE — 99213 OFFICE O/P EST LOW 20 MIN: CPT

## 2024-12-19 PROCEDURE — 76536 US EXAM OF HEAD AND NECK: CPT | Mod: 26

## 2024-12-19 PROCEDURE — 76536 US EXAM OF HEAD AND NECK: CPT

## 2024-12-24 ENCOUNTER — NON-APPOINTMENT (OUTPATIENT)
Age: 29
End: 2024-12-24

## 2025-01-13 ENCOUNTER — APPOINTMENT (OUTPATIENT)
Dept: OTOLARYNGOLOGY | Facility: CLINIC | Age: 30
End: 2025-01-13

## 2025-02-06 ENCOUNTER — APPOINTMENT (OUTPATIENT)
Dept: OTOLARYNGOLOGY | Facility: CLINIC | Age: 30
End: 2025-02-06
Payer: COMMERCIAL

## 2025-02-06 VITALS
SYSTOLIC BLOOD PRESSURE: 112 MMHG | HEART RATE: 91 BPM | DIASTOLIC BLOOD PRESSURE: 77 MMHG | WEIGHT: 130 LBS | BODY MASS INDEX: 24.55 KG/M2 | HEIGHT: 61 IN

## 2025-02-06 DIAGNOSIS — J31.0 CHRONIC RHINITIS: ICD-10-CM

## 2025-02-06 DIAGNOSIS — J32.9 CHRONIC SINUSITIS, UNSPECIFIED: ICD-10-CM

## 2025-02-06 PROCEDURE — 99213 OFFICE O/P EST LOW 20 MIN: CPT | Mod: 25

## 2025-02-06 PROCEDURE — 31231 NASAL ENDOSCOPY DX: CPT

## 2025-02-06 RX ORDER — FLUTICASONE PROPIONATE 50 UG/1
50 SPRAY, METERED NASAL TWICE DAILY
Qty: 1 | Refills: 1 | Status: ACTIVE | COMMUNITY
Start: 2025-02-06 | End: 1900-01-01

## 2025-02-06 RX ORDER — DOXYCYCLINE HYCLATE 100 MG/1
100 CAPSULE ORAL
Qty: 14 | Refills: 0 | Status: ACTIVE | COMMUNITY
Start: 2025-02-06 | End: 1900-01-01

## 2025-02-11 ENCOUNTER — NON-APPOINTMENT (OUTPATIENT)
Age: 30
End: 2025-02-11

## 2025-02-27 ENCOUNTER — APPOINTMENT (OUTPATIENT)
Dept: OTOLARYNGOLOGY | Facility: CLINIC | Age: 30
End: 2025-02-27

## 2025-05-13 NOTE — ED ADULT NURSE NOTE - BREATH SOUNDS, MLM
You can access the FollowMyHealth Patient Portal offered by [facility] by registering at the following website: [email or web address]. By joining [facility]’s FollowMyHealth portal, you will also be able to view your health information using other applications (apps) compatible with our system.
Clear

## 2025-06-25 ENCOUNTER — APPOINTMENT (OUTPATIENT)
Dept: OTOLARYNGOLOGY | Facility: CLINIC | Age: 30
End: 2025-06-25

## 2025-06-25 ENCOUNTER — NON-APPOINTMENT (OUTPATIENT)
Age: 30
End: 2025-06-25

## 2025-06-25 VITALS
HEIGHT: 61 IN | HEART RATE: 82 BPM | BODY MASS INDEX: 24.55 KG/M2 | WEIGHT: 130 LBS | SYSTOLIC BLOOD PRESSURE: 106 MMHG | DIASTOLIC BLOOD PRESSURE: 70 MMHG

## 2025-06-25 PROBLEM — J06.9 URI, ACUTE: Status: ACTIVE | Noted: 2025-06-25 | Resolved: 2025-07-25

## 2025-06-25 PROCEDURE — 99213 OFFICE O/P EST LOW 20 MIN: CPT

## 2025-06-25 RX ORDER — AMOXICILLIN AND CLAVULANATE POTASSIUM 875; 125 MG/1; MG/1
875-125 TABLET, COATED ORAL
Qty: 20 | Refills: 0 | Status: ACTIVE | COMMUNITY
Start: 2025-06-25 | End: 1900-01-01

## 2025-07-18 ENCOUNTER — APPOINTMENT (OUTPATIENT)
Dept: CARDIOLOGY | Facility: CLINIC | Age: 30
End: 2025-07-18

## 2025-07-26 NOTE — ED ADULT TRIAGE NOTE - HEIGHT IN CM
Regarding: I have [place] registration on the line , patient made an appointment with pcp on  ----- Message from Carey PLATA sent at 7/26/2025  6:18 PM CDT -----  Patient Name: Boo Grier    Specialist or PCP Name: Nghia Ying MD    Symptoms: I have [place] [phone]

## 2025-08-21 ENCOUNTER — APPOINTMENT (OUTPATIENT)
Dept: PULMONOLOGY | Facility: CLINIC | Age: 30
End: 2025-08-21
Payer: COMMERCIAL

## 2025-08-21 VITALS
SYSTOLIC BLOOD PRESSURE: 110 MMHG | TEMPERATURE: 97.5 F | OXYGEN SATURATION: 98 % | BODY MASS INDEX: 26.06 KG/M2 | DIASTOLIC BLOOD PRESSURE: 68 MMHG | HEIGHT: 61 IN | HEART RATE: 80 BPM | RESPIRATION RATE: 16 BRPM | WEIGHT: 138 LBS

## 2025-08-21 DIAGNOSIS — J45.20 MILD INTERMITTENT ASTHMA, UNCOMPLICATED: ICD-10-CM

## 2025-08-21 DIAGNOSIS — R06.02 SHORTNESS OF BREATH: ICD-10-CM

## 2025-08-21 DIAGNOSIS — Z82.69 FAMILY HISTORY OF OTHER DISEASES OF THE MUSCULOSKELETAL SYSTEM AND CONNECTIVE TISSUE: ICD-10-CM

## 2025-08-21 LAB
DEPRECATED D DIMER PPP IA-ACNC: <150 NG/ML DDU
EOSINOPHIL # BLD MANUAL: 80 /UL

## 2025-08-21 PROCEDURE — 99214 OFFICE O/P EST MOD 30 MIN: CPT | Mod: 25

## 2025-08-21 PROCEDURE — 95012 NITRIC OXIDE EXP GAS DETER: CPT

## 2025-08-21 RX ORDER — IPRATROPIUM BROMIDE AND ALBUTEROL SULFATE 2.5; .5 MG/3ML; MG/3ML
0.5-2.5 (3) SOLUTION RESPIRATORY (INHALATION)
Qty: 1080 | Refills: 5 | Status: ACTIVE | COMMUNITY
Start: 2025-08-21 | End: 1900-01-01

## 2025-08-22 LAB
GBM AB TITR SER IF: <0.2 AL
Lab: NEGATIVE
MYELOPEROXIDASE AB SER QL IA: <0.2 AL
MYELOPEROXIDASE CELLS FLD QL: NEGATIVE
PROTEINASE3 AB SER IA-ACNC: <0.2 AL
PROTEINASE3 AB SER-ACNC: NEGATIVE

## 2025-08-25 ENCOUNTER — NON-APPOINTMENT (OUTPATIENT)
Age: 30
End: 2025-08-25

## 2025-08-25 LAB
ANA TITR SER: NEGATIVE
STRONGYLOIDES AB SER IA-ACNC: NEGATIVE

## 2025-09-08 ENCOUNTER — APPOINTMENT (OUTPATIENT)
Dept: PULMONOLOGY | Facility: CLINIC | Age: 30
End: 2025-09-08